# Patient Record
Sex: MALE | Race: WHITE | Employment: FULL TIME | URBAN - METROPOLITAN AREA
[De-identification: names, ages, dates, MRNs, and addresses within clinical notes are randomized per-mention and may not be internally consistent; named-entity substitution may affect disease eponyms.]

---

## 2022-04-01 RX ORDER — PANTOPRAZOLE SODIUM 40 MG/1
40 TABLET, DELAYED RELEASE ORAL DAILY
COMMUNITY

## 2022-04-01 RX ORDER — LANOLIN ALCOHOL/MO/W.PET/CERES
1000 CREAM (GRAM) TOPICAL DAILY
COMMUNITY

## 2022-04-01 RX ORDER — FEXOFENADINE HCL 180 MG/1
180 TABLET ORAL DAILY
COMMUNITY

## 2022-04-01 RX ORDER — HYDROCHLOROTHIAZIDE 25 MG/1
25 TABLET ORAL DAILY
COMMUNITY

## 2022-04-01 RX ORDER — CHOLECALCIFEROL (VITAMIN D3) 1250 MCG
CAPSULE ORAL
COMMUNITY
End: 2022-07-28 | Stop reason: ALTCHOICE

## 2022-04-01 RX ORDER — FOLIC ACID 1 MG/1
1 TABLET ORAL DAILY
COMMUNITY

## 2022-04-05 ENCOUNTER — ANESTHESIA EVENT (OUTPATIENT)
Dept: ENDOSCOPY | Age: 52
End: 2022-04-05
Payer: COMMERCIAL

## 2022-04-06 ENCOUNTER — ANESTHESIA (OUTPATIENT)
Dept: ENDOSCOPY | Age: 52
End: 2022-04-06
Payer: COMMERCIAL

## 2022-04-06 ENCOUNTER — HOSPITAL ENCOUNTER (INPATIENT)
Age: 52
LOS: 4 days | Discharge: HOME | DRG: 371 | End: 2022-04-10
Attending: EMERGENCY MEDICINE | Admitting: SURGERY
Payer: COMMERCIAL

## 2022-04-06 ENCOUNTER — APPOINTMENT (OUTPATIENT)
Dept: CT IMAGING | Age: 52
DRG: 371 | End: 2022-04-06
Payer: COMMERCIAL

## 2022-04-06 ENCOUNTER — HOSPITAL ENCOUNTER (OUTPATIENT)
Age: 52
Setting detail: OUTPATIENT SURGERY
Discharge: HOME | End: 2022-04-06
Attending: INTERNAL MEDICINE | Admitting: INTERNAL MEDICINE
Payer: COMMERCIAL

## 2022-04-06 VITALS
RESPIRATION RATE: 16 BRPM | HEART RATE: 76 BPM | TEMPERATURE: 98.3 F | HEIGHT: 71 IN | SYSTOLIC BLOOD PRESSURE: 120 MMHG | BODY MASS INDEX: 33.88 KG/M2 | OXYGEN SATURATION: 97 % | WEIGHT: 242 LBS | DIASTOLIC BLOOD PRESSURE: 77 MMHG

## 2022-04-06 VITALS
DIASTOLIC BLOOD PRESSURE: 70 MMHG | RESPIRATION RATE: 17 BRPM | OXYGEN SATURATION: 96 % | SYSTOLIC BLOOD PRESSURE: 105 MMHG

## 2022-04-06 DIAGNOSIS — R10.9 POSTOPERATIVE ABDOMINAL PAIN WITH FEVER: ICD-10-CM

## 2022-04-06 DIAGNOSIS — K63.1 PERFORATED BOWEL (HCC): Primary | ICD-10-CM

## 2022-04-06 DIAGNOSIS — G89.18 POSTOPERATIVE ABDOMINAL PAIN WITH FEVER: ICD-10-CM

## 2022-04-06 DIAGNOSIS — R50.82 POSTOPERATIVE ABDOMINAL PAIN WITH FEVER: ICD-10-CM

## 2022-04-06 DIAGNOSIS — Z85.038 PERSONAL HISTORY OF COLON CANCER: ICD-10-CM

## 2022-04-06 DIAGNOSIS — K21.9 GASTROESOPHAGEAL REFLUX DISEASE, UNSPECIFIED WHETHER ESOPHAGITIS PRESENT: ICD-10-CM

## 2022-04-06 PROBLEM — K66.8 PNEUMOPERITONEUM: Status: ACTIVE | Noted: 2022-04-06

## 2022-04-06 LAB
A/G RATIO: 1.7 (ref 1.1–2.2)
ALBUMIN SERPL-MCNC: 4.7 G/DL (ref 3.4–5)
ALP BLD-CCNC: 54 U/L (ref 40–129)
ALT SERPL-CCNC: 16 U/L (ref 10–40)
ANION GAP SERPL CALCULATED.3IONS-SCNC: 13 MMOL/L (ref 3–16)
AST SERPL-CCNC: 21 U/L (ref 15–37)
BASOPHILS ABSOLUTE: 0 K/UL (ref 0–0.2)
BASOPHILS RELATIVE PERCENT: 0.3 %
BILIRUB SERPL-MCNC: 0.5 MG/DL (ref 0–1)
BUN BLDV-MCNC: 10 MG/DL (ref 7–20)
CALCIUM SERPL-MCNC: 10.1 MG/DL (ref 8.3–10.6)
CHLORIDE BLD-SCNC: 100 MMOL/L (ref 99–110)
CO2: 25 MMOL/L (ref 21–32)
CREAT SERPL-MCNC: 1.1 MG/DL (ref 0.9–1.3)
EOSINOPHILS ABSOLUTE: 0.1 K/UL (ref 0–0.6)
EOSINOPHILS RELATIVE PERCENT: 0.9 %
GFR AFRICAN AMERICAN: >60
GFR NON-AFRICAN AMERICAN: >60
GLUCOSE BLD-MCNC: 110 MG/DL (ref 70–99)
HCT VFR BLD CALC: 46.2 % (ref 40.5–52.5)
HEMOGLOBIN: 15.6 G/DL (ref 13.5–17.5)
INFLUENZA A: NOT DETECTED
INFLUENZA B: NOT DETECTED
LACTIC ACID, SEPSIS: 1 MMOL/L (ref 0.4–1.9)
LYMPHOCYTES ABSOLUTE: 1 K/UL (ref 1–5.1)
LYMPHOCYTES RELATIVE PERCENT: 6.7 %
MCH RBC QN AUTO: 32 PG (ref 26–34)
MCHC RBC AUTO-ENTMCNC: 33.9 G/DL (ref 31–36)
MCV RBC AUTO: 94.4 FL (ref 80–100)
MONOCYTES ABSOLUTE: 1 K/UL (ref 0–1.3)
MONOCYTES RELATIVE PERCENT: 6.6 %
NEUTROPHILS ABSOLUTE: 12.6 K/UL (ref 1.7–7.7)
NEUTROPHILS RELATIVE PERCENT: 85.5 %
PDW BLD-RTO: 13.7 % (ref 12.4–15.4)
PLATELET # BLD: 305 K/UL (ref 135–450)
PMV BLD AUTO: 7.6 FL (ref 5–10.5)
POTASSIUM SERPL-SCNC: 3.9 MMOL/L (ref 3.5–5.1)
RBC # BLD: 4.89 M/UL (ref 4.2–5.9)
SARS-COV-2 RNA, RT PCR: NOT DETECTED
SODIUM BLD-SCNC: 138 MMOL/L (ref 136–145)
TOTAL PROTEIN: 7.5 G/DL (ref 6.4–8.2)
WBC # BLD: 14.8 K/UL (ref 4–11)

## 2022-04-06 PROCEDURE — 87636 SARSCOV2 & INF A&B AMP PRB: CPT

## 2022-04-06 PROCEDURE — 1200000000 HC SEMI PRIVATE

## 2022-04-06 PROCEDURE — 2580000003 HC RX 258: Performed by: NURSE ANESTHETIST, CERTIFIED REGISTERED

## 2022-04-06 PROCEDURE — 6360000004 HC RX CONTRAST MEDICATION: Performed by: PHYSICIAN ASSISTANT

## 2022-04-06 PROCEDURE — 87077 CULTURE AEROBIC IDENTIFY: CPT

## 2022-04-06 PROCEDURE — 87186 SC STD MICRODIL/AGAR DIL: CPT

## 2022-04-06 PROCEDURE — 7100000011 HC PHASE II RECOVERY - ADDTL 15 MIN: Performed by: INTERNAL MEDICINE

## 2022-04-06 PROCEDURE — 80053 COMPREHEN METABOLIC PANEL: CPT

## 2022-04-06 PROCEDURE — 6360000002 HC RX W HCPCS: Performed by: PHYSICIAN ASSISTANT

## 2022-04-06 PROCEDURE — 3700000000 HC ANESTHESIA ATTENDED CARE: Performed by: INTERNAL MEDICINE

## 2022-04-06 PROCEDURE — 88305 TISSUE EXAM BY PATHOLOGIST: CPT

## 2022-04-06 PROCEDURE — 88342 IMHCHEM/IMCYTCHM 1ST ANTB: CPT

## 2022-04-06 PROCEDURE — 6360000002 HC RX W HCPCS: Performed by: NURSE ANESTHETIST, CERTIFIED REGISTERED

## 2022-04-06 PROCEDURE — 2580000003 HC RX 258: Performed by: PHYSICIAN ASSISTANT

## 2022-04-06 PROCEDURE — 87150 DNA/RNA AMPLIFIED PROBE: CPT

## 2022-04-06 PROCEDURE — 87040 BLOOD CULTURE FOR BACTERIA: CPT

## 2022-04-06 PROCEDURE — 2500000003 HC RX 250 WO HCPCS: Performed by: NURSE ANESTHETIST, CERTIFIED REGISTERED

## 2022-04-06 PROCEDURE — 74177 CT ABD & PELVIS W/CONTRAST: CPT

## 2022-04-06 PROCEDURE — 2580000003 HC RX 258: Performed by: STUDENT IN AN ORGANIZED HEALTH CARE EDUCATION/TRAINING PROGRAM

## 2022-04-06 PROCEDURE — 83605 ASSAY OF LACTIC ACID: CPT

## 2022-04-06 PROCEDURE — 7100000010 HC PHASE II RECOVERY - FIRST 15 MIN: Performed by: INTERNAL MEDICINE

## 2022-04-06 PROCEDURE — 3700000001 HC ADD 15 MINUTES (ANESTHESIA): Performed by: INTERNAL MEDICINE

## 2022-04-06 PROCEDURE — 3609010300 HC COLONOSCOPY W/BIOPSY SINGLE/MULTIPLE: Performed by: INTERNAL MEDICINE

## 2022-04-06 PROCEDURE — 2709999900 HC NON-CHARGEABLE SUPPLY: Performed by: INTERNAL MEDICINE

## 2022-04-06 PROCEDURE — 99283 EMERGENCY DEPT VISIT LOW MDM: CPT

## 2022-04-06 PROCEDURE — 96374 THER/PROPH/DIAG INJ IV PUSH: CPT

## 2022-04-06 PROCEDURE — 3609012400 HC EGD TRANSORAL BIOPSY SINGLE/MULTIPLE: Performed by: INTERNAL MEDICINE

## 2022-04-06 PROCEDURE — 85025 COMPLETE CBC W/AUTO DIFF WBC: CPT

## 2022-04-06 RX ORDER — CIPROFLOXACIN 2 MG/ML
400 INJECTION, SOLUTION INTRAVENOUS CONTINUOUS
Status: DISCONTINUED | OUTPATIENT
Start: 2022-04-06 | End: 2022-04-07 | Stop reason: HOSPADM

## 2022-04-06 RX ORDER — SODIUM CHLORIDE 0.9 % (FLUSH) 0.9 %
5-40 SYRINGE (ML) INJECTION PRN
Status: DISCONTINUED | OUTPATIENT
Start: 2022-04-06 | End: 2022-04-06 | Stop reason: HOSPADM

## 2022-04-06 RX ORDER — SODIUM CHLORIDE 9 MG/ML
INJECTION, SOLUTION INTRAVENOUS CONTINUOUS
Status: DISCONTINUED | OUTPATIENT
Start: 2022-04-06 | End: 2022-04-06 | Stop reason: HOSPADM

## 2022-04-06 RX ORDER — SODIUM CHLORIDE 9 MG/ML
25 INJECTION, SOLUTION INTRAVENOUS PRN
Status: DISCONTINUED | OUTPATIENT
Start: 2022-04-06 | End: 2022-04-06 | Stop reason: HOSPADM

## 2022-04-06 RX ORDER — SODIUM CHLORIDE 9 MG/ML
INJECTION, SOLUTION INTRAVENOUS CONTINUOUS PRN
Status: DISCONTINUED | OUTPATIENT
Start: 2022-04-06 | End: 2022-04-06 | Stop reason: SDUPTHER

## 2022-04-06 RX ORDER — LIDOCAINE HYDROCHLORIDE 20 MG/ML
INJECTION, SOLUTION EPIDURAL; INFILTRATION; INTRACAUDAL; PERINEURAL PRN
Status: DISCONTINUED | OUTPATIENT
Start: 2022-04-06 | End: 2022-04-06 | Stop reason: SDUPTHER

## 2022-04-06 RX ORDER — SODIUM CHLORIDE 0.9 % (FLUSH) 0.9 %
5-40 SYRINGE (ML) INJECTION EVERY 12 HOURS SCHEDULED
Status: DISCONTINUED | OUTPATIENT
Start: 2022-04-06 | End: 2022-04-06 | Stop reason: HOSPADM

## 2022-04-06 RX ORDER — 0.9 % SODIUM CHLORIDE 0.9 %
30 INTRAVENOUS SOLUTION INTRAVENOUS ONCE
Status: COMPLETED | OUTPATIENT
Start: 2022-04-06 | End: 2022-04-07

## 2022-04-06 RX ORDER — PROPOFOL 10 MG/ML
INJECTION, EMULSION INTRAVENOUS PRN
Status: DISCONTINUED | OUTPATIENT
Start: 2022-04-06 | End: 2022-04-06 | Stop reason: SDUPTHER

## 2022-04-06 RX ADMIN — PROPOFOL 100 MG: 10 INJECTION, EMULSION INTRAVENOUS at 13:23

## 2022-04-06 RX ADMIN — IOPAMIDOL 75 ML: 755 INJECTION, SOLUTION INTRAVENOUS at 21:27

## 2022-04-06 RX ADMIN — PROPOFOL 100 MG: 10 INJECTION, EMULSION INTRAVENOUS at 13:42

## 2022-04-06 RX ADMIN — SODIUM CHLORIDE: 9 INJECTION, SOLUTION INTRAVENOUS at 13:11

## 2022-04-06 RX ADMIN — SODIUM CHLORIDE: 9 INJECTION, SOLUTION INTRAVENOUS at 13:47

## 2022-04-06 RX ADMIN — CIPROFLOXACIN 400 MG: 2 INJECTION, SOLUTION INTRAVENOUS at 22:51

## 2022-04-06 RX ADMIN — PROPOFOL 100 MG: 10 INJECTION, EMULSION INTRAVENOUS at 13:16

## 2022-04-06 RX ADMIN — SODIUM CHLORIDE: 9 INJECTION, SOLUTION INTRAVENOUS at 12:46

## 2022-04-06 RX ADMIN — PROPOFOL 100 MG: 10 INJECTION, EMULSION INTRAVENOUS at 13:27

## 2022-04-06 RX ADMIN — PROPOFOL 100 MG: 10 INJECTION, EMULSION INTRAVENOUS at 13:36

## 2022-04-06 RX ADMIN — PROPOFOL 100 MG: 10 INJECTION, EMULSION INTRAVENOUS at 13:20

## 2022-04-06 RX ADMIN — PROPOFOL 100 MG: 10 INJECTION, EMULSION INTRAVENOUS at 13:31

## 2022-04-06 RX ADMIN — PROPOFOL 100 MG: 10 INJECTION, EMULSION INTRAVENOUS at 13:18

## 2022-04-06 RX ADMIN — PROPOFOL 50 MG: 10 INJECTION, EMULSION INTRAVENOUS at 13:48

## 2022-04-06 RX ADMIN — LIDOCAINE HYDROCHLORIDE 60 MG: 20 INJECTION, SOLUTION EPIDURAL; INFILTRATION; INTRACAUDAL; PERINEURAL at 13:16

## 2022-04-06 RX ADMIN — SODIUM CHLORIDE 2259 ML: 9 INJECTION, SOLUTION INTRAVENOUS at 22:50

## 2022-04-06 ASSESSMENT — PAIN SCALES - GENERAL
PAINLEVEL_OUTOF10: 0

## 2022-04-06 ASSESSMENT — ENCOUNTER SYMPTOMS
DIARRHEA: 0
NAUSEA: 0
SHORTNESS OF BREATH: 0
RHINORRHEA: 0
ABDOMINAL PAIN: 0
WHEEZING: 0
SHORTNESS OF BREATH: 0
VOMITING: 0
COUGH: 0

## 2022-04-06 ASSESSMENT — PAIN - FUNCTIONAL ASSESSMENT: PAIN_FUNCTIONAL_ASSESSMENT: 0-10

## 2022-04-06 NOTE — H&P
[id number]   Pre-operative History and Physical    Patient: Shelli Acosta  : 1970  Acct#:     HISTORY OF PRESENT ILLNESS:    The patient is a 46 y.o. male who presents for EGD/colonoscopy    Indications: stage IV colon cancer at the splenic flexure with gastric involvement s/p failed stenting, s/p loop colostomy, to reassess disease prior to potential parastomal hernia repair    Past Medical History:        Diagnosis Date    Arthritis     rheumatoid    Cancer ([facility] [address]     colon    Hypertension       Past Surgical History:        Procedure Laterality Date    ABDOMEN SURGERY      colectomy with ostomy    COLONOSCOPY      PORT SURGERY        Medications Prior to Admission:   No current facility-administered medications on file prior to encounter.      Current Outpatient Medications on File Prior to Encounter   Medication Sig Dispense Refill    inFLIXimab-dyyb (INFLECTRA IV) Infuse intravenously Once every 6 weeks      pantoprazole (PROTONIX) 40 MG tablet Take 40 mg by mouth daily      methotrexate (RHEUMATREX) 2.5 MG chemo tablet Take 2.5 mg by mouth once a week Takes 4 weekly      folic acid (FOLVITE) 1 MG tablet Take 1 mg by mouth daily      hydroCHLOROthiazide (HYDRODIURIL) 25 MG tablet Take 25 mg by mouth daily      vitamin B-12 (CYANOCOBALAMIN) 1000 MCG tablet Take 1,000 mcg by mouth daily      fexofenadine (ALLEGRA ALLERGY) 180 MG tablet Take 180 mg by mouth daily      Cholecalciferol (VITAMIN D3) 1.25 MG (32061 UT) CAPS Take by mouth      losartan (COZAAR) 100 MG tablet Take 100 mg by mouth daily          Allergies:  Amoxicillin    Social History:   Social History     Socioeconomic History    Marital status:      Spouse name: Not on file    Number of children: Not on file    Years of education: Not on file    Highest education level: Not on file   Occupational History    Not on file   Tobacco Use    Smoking status: Never Smoker    Smokeless tobacco: Never Used   Substance and Sexual Activity    Alcohol use: Yes     Comment: rarely    Drug use: Not on file    Sexual activity: Not on file   Other Topics Concern    Not on file   Social History Narrative    Not on file     Social Determinants of Health     Financial Resource Strain:     Difficulty of Paying Living Expenses: Not on file   Food Insecurity:     Worried About Running Out of Food in the Last Year: Not on file    Daniela of Food in the Last Year: Not on file   Transportation Needs:     Lack of Transportation (Medical): Not on file    Lack of Transportation (Non-Medical): Not on file   Physical Activity:     Days of Exercise per Week: Not on file    Minutes of Exercise per Session: Not on file   Stress:     Feeling of Stress : Not on file   Social Connections:     Frequency of Communication with Friends and Family: Not on file    Frequency of Social Gatherings with Friends and Family: Not on file    Attends Scientologist Services: Not on file    Active Member of [address] [place] [address] or Organizations: Not on file    Attends Club or Organization Meetings: Not on file    Marital Status: Not on file   Intimate Partner Violence:     Fear of Current or Ex-Partner: Not on file    Emotionally Abused: Not on file    Physically Abused: Not on file    Sexually Abused: Not on file   Housing Stability:     Unable to Pay for Housing in the Last Year: Not on file    Number of [place] in the Last Year: Not on file    Unstable Housing in the Last Year: Not on file      Family History:   History reviewed. No pertinent family history.      PHYSICAL EXAM:      Ht 5' 11\" (1.803 m)   Wt 242 lb (109.8 kg)   BMI 33.75 kg/m²  I        Heart:  Normal apical impulse, regular rate and rhythm, normal S1 and S2, no S3 or S4, and no murmur noted    Lungs:  No increased work of breathing, good air exchange, clear to auscultation bilaterally, no crackles or wheezing    Abdomen:  No scars, normal bowel sounds, soft, non-distended, non-tender, no masses palpated, no hepatosplenomegally      ASA Class  ASA 2 - Patient with mild systemic disease with no functional limitations    Mallampati Class: II      ASSESSMENT AND PLAN:    1. Patient is a suitable candidate for endoscopic procedure and attendant anesthesia  2. Risks, benefits, alternatives of procedure discussed in detail with patient including risks of bleeding, infection, perforation, risks of sedation, risks of missed lesions. The patient wishes to proceed.

## 2022-04-06 NOTE — ANESTHESIA POSTPROCEDURE EVALUATION
Department of Anesthesiology  Postprocedure Note    Patient: Shalom Parks  MRN: [id number]  YOB: 1970  Date of evaluation: 4/6/2022  Time:  2:01 PM     Procedure Summary     Date: 04/06/22 Room / Location: [address] [place] [address]    Anesthesia Start: 1311 Anesthesia Stop: 5093    Procedures:       COLONOSCOPY WITH BIOPSY (N/A )      EGD BIOPSY (N/A ) Diagnosis:       Personal history of colon cancer      Gastroesophageal reflux disease, unspecified whether esophagitis present      (Personal history of colon cancer, Gastroesophageal reflux disease)    Surgeons: Laina Cuenca MD Responsible Provider: Liliya Yip MD    Anesthesia Type: MAC ASA Status: 2          Anesthesia Type: MAC    Davis Phase I: Davis Score: 10    Davis Phase II:      Last vitals: Reviewed and per EMR flowsheets.        Anesthesia Post Evaluation    Patient location during evaluation: PACU  Patient participation: complete - patient participated  Level of consciousness: awake  Airway patency: patent  Nausea & Vomiting: no nausea and no vomiting  Cardiovascular status: blood pressure returned to baseline  Respiratory status: acceptable  Hydration status: stable  Multimodal analgesia pain management approach

## 2022-04-06 NOTE — ANESTHESIA PRE PROCEDURE
Department of Anesthesiology  Preprocedure Note       Name:  Nino Mckoy   Age:  46 y.o.  :  1970                                          MRN:  [id number]         Date:  2022      Surgeon: Rosie Zheng):  Meri Felton MD    Procedure: Procedure(s):  COLONOSCOPY DIAGNOSTIC  EGD ESOPHAGOGASTRODUODENOSCOPY    Medications prior to admission:   Prior to Admission medications    Medication Sig Start Date End Date Taking?  Authorizing Provider   inFLIXimab-dyyb (INFLECTRA IV) Infuse intravenously Once every 6 weeks   Yes Historical Provider, MD   pantoprazole (PROTONIX) 40 MG tablet Take 40 mg by mouth daily   Yes Historical Provider, MD   methotrexate (RHEUMATREX) 2.5 MG chemo tablet Take 2.5 mg by mouth once a week Takes 4 weekly   Yes Historical Provider, MD   folic acid (FOLVITE) 1 MG tablet Take 1 mg by mouth daily   Yes Historical Provider, MD   hydroCHLOROthiazide (HYDRODIURIL) 25 MG tablet Take 25 mg by mouth daily   Yes Historical Provider, MD   vitamin B-12 (CYANOCOBALAMIN) 1000 MCG tablet Take 1,000 mcg by mouth daily   Yes Historical Provider, MD   fexofenadine (ALLEGRA ALLERGY) 180 MG tablet Take 180 mg by mouth daily   Yes Historical Provider, MD   Cholecalciferol (VITAMIN D3) 1.25 MG (99190 UT) CAPS Take by mouth   Yes Historical Provider, MD   losartan (COZAAR) 100 MG tablet Take 100 mg by mouth daily    Historical Provider, MD       Current medications:    Current Facility-Administered Medications   Medication Dose Route Frequency Provider Last Rate Last Admin    0.9 % sodium chloride infusion   IntraVENous Continuous Chyna Peoples  mL/hr at 22 1246 New Bag at 22 1246    sodium chloride flush 0.9 % injection 5-40 mL  5-40 mL IntraVENous 2 times per day Chyna Peoples MD        sodium chloride flush 0.9 % injection 5-40 mL  5-40 mL IntraVENous PRN Chyna Peoples MD        0.9 % sodium chloride infusion  25 mL IntraVENous PRN Chyna Peoples MD           Allergies: Allergies   Allergen Reactions    Amoxicillin Hives       Problem List:  There is no problem list on file for this patient. Past Medical History:        Diagnosis Date    Arthritis     rheumatoid    Cancer ([address]     colon    Hypertension        Past Surgical History:        Procedure Laterality Date    ABDOMEN SURGERY      colectomy with ostomy    COLONOSCOPY      PORT SURGERY         Social History:    Social History     Tobacco Use    Smoking status: Never Smoker    Smokeless tobacco: Never Used   Substance Use Topics    Alcohol use: Yes     Comment: rarely                                Counseling given: Not Answered      Vital Signs (Current):   Vitals:    04/01/22 1143 04/06/22 1233   BP:  126/78   Pulse:  90   Resp:  17   Temp:  97.9 °F (36.6 °C)   TempSrc:  Temporal   SpO2:  98%   Weight: 242 lb (109.8 kg) 242 lb (109.8 kg)   Height: 5' 11\" (1.803 m) 5' 11\" (1.803 m)                                              BP Readings from Last 3 Encounters:   04/06/22 126/78   09/21/16 121/61       NPO Status: Time of last liquid consumption: 0815                        Time of last solid consumption: 1830                        Date of last liquid consumption: 04/06/22                        Date of last solid food consumption: 04/04/22    BMI:   Wt Readings from Last 3 Encounters:   04/06/22 242 lb (109.8 kg)   09/21/16 205 lb (93 kg)     Body mass index is 33.75 kg/m².     CBC:   Lab Results   Component Value Date    WBC 10.0 09/21/2016    RBC 3.98 09/21/2016    HGB 9.8 09/21/2016    HCT 30.8 09/21/2016    MCV 77.6 09/21/2016    RDW 15.6 09/21/2016     09/21/2016       CMP:   Lab Results   Component Value Date     09/21/2016    K 4.0 09/21/2016    CL 98 09/21/2016    CO2 29 09/21/2016    BUN 9 09/21/2016    CREATININE 0.9 09/21/2016    GFRAA >60 09/21/2016    AGRATIO 1.2 09/21/2016    LABGLOM >60 09/21/2016    GLUCOSE 94 09/21/2016    PROT 6.8 09/21/2016    CALCIUM 9.4 09/21/2016 BILITOT <0.2 09/21/2016    ALKPHOS 45 09/21/2016    AST 13 09/21/2016    ALT 10 09/21/2016       POC Tests: No results for input(s): POCGLU, POCNA, POCK, POCCL, POCBUN, POCHEMO, POCHCT in the last 72 hours. Coags: No results found for: PROTIME, INR, APTT    HCG (If Applicable): No results found for: PREGTESTUR, PREGSERUM, HCG, HCGQUANT     ABGs: No results found for: PHART, PO2ART, PFW3BMW, IOI5CQH, BEART, Y9WAJGTA     Type & Screen (If Applicable):  No results found for: LABABO, LABRH    Drug/Infectious Status (If Applicable):  No results found for: HIV, HEPCAB    COVID-19 Screening (If Applicable): No results found for: COVID19        Anesthesia Evaluation  Patient summary reviewed and Nursing notes reviewed no history of anesthetic complications:   Airway: Mallampati: I  TM distance: >3 FB   Neck ROM: full  Mouth opening: > = 3 FB Dental: normal exam         Pulmonary: breath sounds clear to auscultation      (-) asthma and shortness of breath                           Cardiovascular:  Exercise tolerance: good (>4 METS),   (+) hypertension (on HCTZ-Losartan):,     (-) past MI, CAD, dysrhythmias and  MAJANO      Rhythm: regular  Rate: normal                    Neuro/Psych:      (-) TIA and CVA           GI/Hepatic/Renal:   (+) bowel prep,      (-) GERD, liver disease and no renal disease       Endo/Other:    (+) : arthritis (on methotrexate and infliximab): rheumatoid. , .    (-) diabetes mellitus               Abdominal:   (+) obese,           Vascular: negative vascular ROS. Other Findings:           Anesthesia Plan      MAC     ASA 2     (Discussed risks and benefits to sedation including nausea, vomiting, allergic reaction, headache, delayed cognitive recovery, stroke, heart attack, respiratory depression, and death which patient understood and agreed to proceed.    The patient was given the opportunity to ask questions and all questions were answered to the patient's satisfaction.  )  Induction: intravenous. Anesthetic plan and risks discussed with patient. Plan discussed with CRNA. This pre-anesthesia assessment may be used as a history and physical.    DOS STAFF ADDENDUM:    Pt seen and examined, chart reviewed (including anesthesia, drug and allergy history). No interval changes to history and physical examination. Anesthetic plan, risks, benefits, alternatives, and personnel involved discussed with patient. Patient verbalized an understanding and agrees to proceed.       Josiane Zapien MD  April 6, 2022  12:53 PM

## 2022-04-06 NOTE — OP NOTE
Esophagogastroduodenoscopy/Colonoscopy Procedure Note      Patient: Niki Patel  : 1970  Acct#:     Procedure: Esophagogastroduodenoscopy and colonoscopy    Date:  2022    Surgeon:  Lucina Traore MD    Preoperative Diagnosis:     stage IV colon cancer at the splenic flexure with gastric involvement s/p failed stenting, s/p loop colostomy, to reassess disease prior to potential parastomal hernia repair    Postoperative Diagnosis:    EGD:  1) mid portion of a colonic stent courses through the gastric wall (enters then exits the stomach), with fracture of the mid intragastric portion of the stent  2) 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 within the stent lumen, biopsied    Colonoscopy:  1) from rectum to 50 cm from anal verge: sigmoid diverticulosis, diverticular mucus obscuring much of lumen, no obstructing masses, scope could not be advanced beyond descending colon (tumor not seen) due to looping  2) proximally from colostomy: no obstructing mass from the colostomy to the cecum within limits of a fair prep  3) distally from colostomy: stenosis with tumor ingrowth as well as proximal struts of colonic stent seen at 25 cm from colostomy, biopsies of tumor taken    Consent:  The patient or their legal guardian has signed a consent, and is aware of the potential risks, benefits, alternatives, and potential complications of this procedure. These include, but are not limited to hemorrhage, bleeding, post procedural pain, perforation, phlebitis, aspiration, hypotension, hypoxia, cardiovascular events such as arryhthmia, and possibly death. Additionally, the possibility of missed lesions was conveyed to patient. Anesthesia:  MAC    Procedure: An informed consent was obtained from the patient after explanation of indications, benefits, possible risks and complications of the procedure.   The patient was then taken to the endoscopy suite, placed in the left lateral position, and the above IV anesthesia was administered. The Olympus gastroscope was placed in the patient's mouth and advanced under direct vision through the esophagus and stomach to the second portion of the duodenum. By EGD the esophagus was normal.  The proximal part of the stomach was normal.  In the posterior gastric body we could visualize the previous colonic stent which appeared to go in and then out of the stomach. It was fractured in the midportion. We could look inside the stent and we actually could see a fistulous tract presumably to the colon with some surrounding malignant appearing tissue that was biopsied. The distal stomach was normal.  The duodenum was normal to the second portion. The ampulla of Vater was not seen. Gastroscope was withdrawn as the upper GI tract was decompressed. Following esophagogastroduodenoscopy the bed was turned 180 degrees and then colonoscopy was performed. First we passed a pediatric colonoscope through the rectum. Digital rectal exam was normal.  The scope was advanced from the rectum to 50 cm from the anal verge. There were diverticulosis in the sigmoid colon. There were retained mucus balls due to diversion changes which obscured much of the mucosa however there were no obstructing masses to 50 cm. At that point due to scope looping we cannot advance the scope further. We did not visualize the tumor. The scope was withdrawn and retroflexion not performed in the rectum. We then removed the pediatric colonoscope and then passed the scope from the prolapsed colostomy all the way to the cecum. There are no obstructing masses although prep was only fair with some retained food particles and we could not insufflate well due to lack of air retention. We then withdrew the scope and passed the scope from the colostomy distally and we could visualized a stenotic tumor 25 cm from the colostomy.   We could actually see the proximal struts from the previous colonic stent at the top edge of this tumor. We took multiple biopsies of the stenotic tumor. We could not advance the scope any further. We withdrew the scope. There were no other obstructing masses seen. The patient was decompressed as the colonoscope was withdrawn. All instruments were removed. The patient tolerated the procedure well and was taken to the PACU in good condition. 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 Further management per Surgery--findings conveyed to Dr Opal Menezes MD  [facility]  4/6/2022  [phone]

## 2022-04-07 LAB
ANION GAP SERPL CALCULATED.3IONS-SCNC: 12 MMOL/L (ref 3–16)
BUN BLDV-MCNC: 8 MG/DL (ref 7–20)
CALCIUM SERPL-MCNC: 9.6 MG/DL (ref 8.3–10.6)
CHLORIDE BLD-SCNC: 101 MMOL/L (ref 99–110)
CO2: 26 MMOL/L (ref 21–32)
CREAT SERPL-MCNC: 0.9 MG/DL (ref 0.9–1.3)
GFR AFRICAN AMERICAN: >60
GFR NON-AFRICAN AMERICAN: >60
GLUCOSE BLD-MCNC: 105 MG/DL (ref 70–99)
HCT VFR BLD CALC: 40.8 % (ref 40.5–52.5)
HEMOGLOBIN: 14.4 G/DL (ref 13.5–17.5)
MCH RBC QN AUTO: 33.2 PG (ref 26–34)
MCHC RBC AUTO-ENTMCNC: 35.2 G/DL (ref 31–36)
MCV RBC AUTO: 94.4 FL (ref 80–100)
PDW BLD-RTO: 13.8 % (ref 12.4–15.4)
PLATELET # BLD: 235 K/UL (ref 135–450)
PMV BLD AUTO: 7.9 FL (ref 5–10.5)
POTASSIUM SERPL-SCNC: 4 MMOL/L (ref 3.5–5.1)
RBC # BLD: 4.32 M/UL (ref 4.2–5.9)
REPORT: NORMAL
SODIUM BLD-SCNC: 139 MMOL/L (ref 136–145)
WBC # BLD: 9.5 K/UL (ref 4–11)

## 2022-04-07 PROCEDURE — 85027 COMPLETE CBC AUTOMATED: CPT

## 2022-04-07 PROCEDURE — 99223 1ST HOSP IP/OBS HIGH 75: CPT | Performed by: INTERNAL MEDICINE

## 2022-04-07 PROCEDURE — 6360000002 HC RX W HCPCS: Performed by: FAMILY MEDICINE

## 2022-04-07 PROCEDURE — 1200000000 HC SEMI PRIVATE

## 2022-04-07 PROCEDURE — 80048 BASIC METABOLIC PNL TOTAL CA: CPT

## 2022-04-07 PROCEDURE — 6360000002 HC RX W HCPCS: Performed by: PHYSICIAN ASSISTANT

## 2022-04-07 PROCEDURE — 36415 COLL VENOUS BLD VENIPUNCTURE: CPT

## 2022-04-07 PROCEDURE — 2580000003 HC RX 258: Performed by: FAMILY MEDICINE

## 2022-04-07 PROCEDURE — APPNB30 APP NON BILLABLE TIME 0-30 MINS: Performed by: NURSE PRACTITIONER

## 2022-04-07 PROCEDURE — 2500000003 HC RX 250 WO HCPCS: Performed by: PHYSICIAN ASSISTANT

## 2022-04-07 PROCEDURE — 99223 1ST HOSP IP/OBS HIGH 75: CPT | Performed by: SURGERY

## 2022-04-07 PROCEDURE — 6360000002 HC RX W HCPCS: Performed by: INTERNAL MEDICINE

## 2022-04-07 PROCEDURE — 6370000000 HC RX 637 (ALT 250 FOR IP): Performed by: FAMILY MEDICINE

## 2022-04-07 RX ORDER — ONDANSETRON 2 MG/ML
4 INJECTION INTRAMUSCULAR; INTRAVENOUS EVERY 6 HOURS PRN
Status: DISCONTINUED | OUTPATIENT
Start: 2022-04-07 | End: 2022-04-10 | Stop reason: HOSPADM

## 2022-04-07 RX ORDER — MORPHINE SULFATE 4 MG/ML
4 INJECTION, SOLUTION INTRAMUSCULAR; INTRAVENOUS EVERY 4 HOURS PRN
Status: DISCONTINUED | OUTPATIENT
Start: 2022-04-07 | End: 2022-04-10 | Stop reason: HOSPADM

## 2022-04-07 RX ORDER — SODIUM CHLORIDE 9 MG/ML
INJECTION, SOLUTION INTRAVENOUS CONTINUOUS
Status: DISCONTINUED | OUTPATIENT
Start: 2022-04-07 | End: 2022-04-10 | Stop reason: HOSPADM

## 2022-04-07 RX ORDER — ONDANSETRON 4 MG/1
4 TABLET, ORALLY DISINTEGRATING ORAL EVERY 8 HOURS PRN
Status: DISCONTINUED | OUTPATIENT
Start: 2022-04-07 | End: 2022-04-10 | Stop reason: HOSPADM

## 2022-04-07 RX ORDER — ACETAMINOPHEN 325 MG/1
650 TABLET ORAL EVERY 6 HOURS PRN
Status: DISCONTINUED | OUTPATIENT
Start: 2022-04-07 | End: 2022-04-10 | Stop reason: HOSPADM

## 2022-04-07 RX ORDER — LOSARTAN POTASSIUM 25 MG/1
50 TABLET ORAL DAILY
Status: DISCONTINUED | OUTPATIENT
Start: 2022-04-07 | End: 2022-04-10 | Stop reason: HOSPADM

## 2022-04-07 RX ORDER — HYDROCHLOROTHIAZIDE 25 MG/1
25 TABLET ORAL DAILY
Status: DISCONTINUED | OUTPATIENT
Start: 2022-04-07 | End: 2022-04-07

## 2022-04-07 RX ORDER — FLUCONAZOLE 2 MG/ML
200 INJECTION, SOLUTION INTRAVENOUS EVERY 24 HOURS
Status: DISCONTINUED | OUTPATIENT
Start: 2022-04-07 | End: 2022-04-10 | Stop reason: HOSPADM

## 2022-04-07 RX ORDER — SODIUM CHLORIDE 0.9 % (FLUSH) 0.9 %
5-40 SYRINGE (ML) INJECTION EVERY 12 HOURS SCHEDULED
Status: DISCONTINUED | OUTPATIENT
Start: 2022-04-07 | End: 2022-04-10 | Stop reason: HOSPADM

## 2022-04-07 RX ORDER — SODIUM CHLORIDE 0.9 % (FLUSH) 0.9 %
5-40 SYRINGE (ML) INJECTION PRN
Status: DISCONTINUED | OUTPATIENT
Start: 2022-04-07 | End: 2022-04-10 | Stop reason: HOSPADM

## 2022-04-07 RX ORDER — SODIUM CHLORIDE 9 MG/ML
INJECTION, SOLUTION INTRAVENOUS PRN
Status: DISCONTINUED | OUTPATIENT
Start: 2022-04-07 | End: 2022-04-10 | Stop reason: HOSPADM

## 2022-04-07 RX ORDER — ACETAMINOPHEN 650 MG/1
650 SUPPOSITORY RECTAL EVERY 6 HOURS PRN
Status: DISCONTINUED | OUTPATIENT
Start: 2022-04-07 | End: 2022-04-10 | Stop reason: HOSPADM

## 2022-04-07 RX ORDER — POLYETHYLENE GLYCOL 3350 17 G/17G
17 POWDER, FOR SOLUTION ORAL DAILY PRN
Status: DISCONTINUED | OUTPATIENT
Start: 2022-04-07 | End: 2022-04-10 | Stop reason: HOSPADM

## 2022-04-07 RX ADMIN — HYDROCHLOROTHIAZIDE 25 MG: 25 TABLET ORAL at 08:48

## 2022-04-07 RX ADMIN — CIPROFLOXACIN 400 MG: 2 INJECTION, SOLUTION INTRAVENOUS at 00:14

## 2022-04-07 RX ADMIN — SODIUM CHLORIDE, PRESERVATIVE FREE 10 ML: 5 INJECTION INTRAVENOUS at 08:49

## 2022-04-07 RX ADMIN — ENOXAPARIN SODIUM 40 MG: 40 INJECTION SUBCUTANEOUS at 08:48

## 2022-04-07 RX ADMIN — PIPERACILLIN AND TAZOBACTAM 3375 MG: 3; .375 INJECTION, POWDER, LYOPHILIZED, FOR SOLUTION INTRAVENOUS at 02:12

## 2022-04-07 RX ADMIN — METRONIDAZOLE 500 MG: 500 INJECTION, SOLUTION INTRAVENOUS at 00:18

## 2022-04-07 RX ADMIN — SODIUM CHLORIDE: 9 INJECTION, SOLUTION INTRAVENOUS at 00:41

## 2022-04-07 RX ADMIN — PIPERACILLIN AND TAZOBACTAM 3375 MG: 3; .375 INJECTION, POWDER, LYOPHILIZED, FOR SOLUTION INTRAVENOUS at 17:15

## 2022-04-07 RX ADMIN — FLUCONAZOLE 200 MG: 2 INJECTION, SOLUTION INTRAVENOUS at 14:37

## 2022-04-07 RX ADMIN — PIPERACILLIN AND TAZOBACTAM 3375 MG: 3; .375 INJECTION, POWDER, LYOPHILIZED, FOR SOLUTION INTRAVENOUS at 10:10

## 2022-04-07 RX ADMIN — LOSARTAN POTASSIUM 50 MG: 25 TABLET, FILM COATED ORAL at 08:48

## 2022-04-07 ASSESSMENT — PAIN SCALES - GENERAL
PAINLEVEL_OUTOF10: 0

## 2022-04-07 ASSESSMENT — ENCOUNTER SYMPTOMS
DIARRHEA: 0
CONSTIPATION: 0
TROUBLE SWALLOWING: 0
WHEEZING: 0
SORE THROAT: 0
EYE DISCHARGE: 0
COUGH: 0
RHINORRHEA: 0
SHORTNESS OF BREATH: 0
BACK PAIN: 0
ABDOMINAL PAIN: 1
EYE REDNESS: 0
NAUSEA: 0

## 2022-04-07 ASSESSMENT — PAIN DESCRIPTION - PAIN TYPE: TYPE: ACUTE PAIN

## 2022-04-07 NOTE — CONSULTS
[facility] GENERAL AND LAPAROSCOPIC SURGERY                       PATIENT NAME: Portillo Ortiz     ADMISSION DATE: 4/6/2022  7:55 PM      TODAY'S DATE: 4/7/2022    Reason for Consult:  Pneumoperitoneum    Requesting Physician:  NARINDER Bañuelos    HISTORY OF PRESENT ILLNESS:              The patient is a 46 y.o. male who presents with abdominal concerns post colonoscopy. Had procedure done, had low grade fever and chills, presented to ER. Pt had scope with Dr. Dmitry Fernandez yesterday, has longstanding known metastatic colon cancer. Initially got stent for obstruction at splenic flexure, unsuccessful , and then loop TV colostomy. Years ago. Has had adj chemo and immunotherapy,  Had disease burden drastically reduced, but not totally curative. No resection of primary has been done. Presents now post colonoscopy. Had stent erosion thru colon, and fistula into stomach, with stent visible on upper and lower scopes. (photos and report reviewed). Pt feeling better, admitted with NPO, and IV atbx overnight, no recurring fever. Has had plans to meet with Dr. General Montalvo at [facility] regarding colon status / hernia / cancer etc tomorrow. No N/V/D.  No fever this am.     Past Medical History:        Diagnosis Date    Arthritis     rheumatoid    Cancer ([address]     colon    Hypertension        Past Surgical History:        Procedure Laterality Date    ABDOMEN SURGERY      colectomy with ostomy    COLONOSCOPY      COLONOSCOPY N/A 4/6/2022    COLONOSCOPY WITH BIOPSY performed by Oxana Joya MD at [address] [place] [address] GASTROINTESTINAL ENDOSCOPY N/A 4/6/2022    EGD BIOPSY performed by Oxana Joya MD at [place] [address]       Current Medications:   Current Facility-Administered Medications: piperacillin-tazobactam (ZOSYN) 3,375 mg in dextrose 5 % 50 mL IVPB extended infusion (mini-bag), 3,375 mg, IntraVENous, Q8H  0.9 % sodium chloride infusion, , IntraVENous, Continuous  morphine injection 4 mg, 4 mg, IntraVENous, Q4H PRN  losartan (COZAAR) tablet 50 mg, 50 mg, Oral, Daily  hydroCHLOROthiazide (HYDRODIURIL) tablet 25 mg, 25 mg, Oral, Daily  sodium chloride flush 0.9 % injection 5-40 mL, 5-40 mL, IntraVENous, 2 times per day  sodium chloride flush 0.9 % injection 5-40 mL, 5-40 mL, IntraVENous, PRN  0.9 % sodium chloride infusion, , IntraVENous, PRN  enoxaparin (LOVENOX) injection 40 mg, 40 mg, SubCUTAneous, Daily  ondansetron (ZOFRAN-ODT) disintegrating tablet 4 mg, 4 mg, Oral, Q8H PRN **OR** ondansetron (ZOFRAN) injection 4 mg, 4 mg, IntraVENous, Q6H PRN  polyethylene glycol (GLYCOLAX) packet 17 g, 17 g, Oral, Daily PRN  acetaminophen (TYLENOL) tablet 650 mg, 650 mg, Oral, Q6H PRN **OR** acetaminophen (TYLENOL) suppository 650 mg, 650 mg, Rectal, Q6H PRN  Prior to Admission medications    Medication Sig Start Date End Date Taking? Authorizing Provider   inFLIXimab-dyyb (INFLECTRA IV) Infuse intravenously Once every 6 weeks    Historical Provider, MD   pantoprazole (PROTONIX) 40 MG tablet Take 40 mg by mouth daily    Historical Provider, MD   methotrexate (RHEUMATREX) 2.5 MG chemo tablet Take 2.5 mg by mouth once a week Takes 4 weekly    Historical Provider, MD   folic acid (FOLVITE) 1 MG tablet Take 1 mg by mouth daily    Historical Provider, MD   hydroCHLOROthiazide (HYDRODIURIL) 25 MG tablet Take 25 mg by mouth daily    Historical Provider, MD   vitamin B-12 (CYANOCOBALAMIN) 1000 MCG tablet Take 1,000 mcg by mouth daily    Historical Provider, MD   fexofenadine (ALLEGRA ALLERGY) 180 MG tablet Take 180 mg by mouth daily    Historical Provider, MD   Cholecalciferol (VITAMIN D3) 1.25 MG (14403 UT) CAPS Take by mouth    Historical Provider, MD   losartan (COZAAR) 100 MG tablet Take 100 mg by mouth daily    Historical Provider, MD        Allergies:  Amoxicillin    Social History:    reports that he has never smoked. He has never used smokeless tobacco. He reports current alcohol use.     Family History:    History reviewed. No pertinent family history. REVIEW OF SYSTEMS:  CONSTITUTIONAL:  negative  HEENT:  negative  RESPIRATORY:  negative  CARDIOVASCULAR:  negative  GASTROINTESTINAL:  negative  GENITOURINARY:  negative  HEMATOLOGIC/LYMPHATIC:  negative  NEUROLOGICAL:  negative  SKIN: negative    PHYSICAL EXAM:  VITALS:  /76   Pulse 72   Temp 98.4 °F (36.9 °C) (Oral)   Resp 18   Ht 5' 11\" (1.803 m)   Wt 240 lb (108.9 kg)   SpO2 96%   BMI 33.47 kg/m²   24HR INTAKE/OUTPUT:      Intake/Output Summary (Last 24 hours) at 4/7/2022 0912  Last data filed at 4/7/2022 0520  Gross per 24 hour   Intake --   Output 920 ml   Net -920 ml     DRAIN/TUBE OUTPUT:     CONSTITUTIONAL:  alert, no apparent distress and moderately overweight  EYES:  sclera clear  ENT:  normocepalic, without obvious abnormality  NECK:  supple, symmetrical, trachea midline  LUNGS:  clear to auscultation  CARDIOVASCULAR:  regular rate and rhythm and no murmur noted  ABDOMEN: healed scars noted, colostomy on right abdomen, mild prolapse, with peristomal hernia, normal bowel sounds, soft, non-distended, non-tender, voluntary guarding absent, no masses palpated, no hepatosplenomegally and hernia present - stomal region  MUSCULOSKELETAL:  0+ pitting edema lower extremities  NEUROLOGIC:  Mental Status Exam:  Level of Alertness:   awake  Orientation:   person, place, time  SKIN:  no bruising or bleeding, normal skin color, texture, turgor and no redness, warmth, or swelling    DATA:    CBC:   Recent Labs     04/06/22 2020 04/07/22  0426   WBC 14.8* 9.5   HGB 15.6 14.4   HCT 46.2 40.8    235     BMP:    Recent Labs     04/06/22 2020 04/07/22  0426    139   K 3.9 4.0    101   CO2 25 26   BUN 10 8   CREATININE 1.1 0.9   GLUCOSE 110* 105*     Hepatic:   Recent Labs     04/06/22 2020   AST 21   ALT 16   BILITOT 0.5   ALKPHOS 54     Mag:    No results for input(s): MG in the last 72 hours.    Phos:   No results for input(s): PHOS in the last 72 hours.   INR: No results for input(s): INR in the last 72 hours. LIPASE: No results for input(s): LIPASE in the last 72 hours. AMYLASE: No results for input(s): AMYLASE in the last 72 hours. Radiology Review:       CT ABDOMEN PELVIS W IV CONTRAST Additional Contrast? None    Result Date: 4/6/2022  EXAMINATION: CT OF THE ABDOMEN AND PELVIS WITH CONTRAST 4/6/2022 9:14 pm TECHNIQUE: CT of the abdomen and pelvis was performed with the administration of intravenous contrast. Multiplanar reformatted images are provided for review. Dose modulation, iterative reconstruction, and/or weight based adjustment of the mA/kV was utilized to reduce the radiation dose to as low as reasonably achievable. COMPARISON: None. HISTORY: ORDERING SYSTEM PROVIDED HISTORY: post op fever TECHNOLOGIST PROVIDED HISTORY: Reason for exam:->post op fever Additional Contrast?->None Decision Support Exception - unselect if not a suspected or confirmed emergency medical condition->Emergency Medical Condition (MA) Reason for Exam: Fall (Patient states that he was walking to the basement and tripped and fell down approx 14 steps this morning. He endorses loss of consciousness,knot on his head and states that he has thrown up several times throughout the day and is experiencing right sided flank pain.) FINDINGS: Lower Chest: The lung bases are clear. Organs: The liver is borderline enlarged with fatty replacement throughout. The spleen measures 14 cm in length and is normal density. There are a couple of partially calcified masses along the right lobe of the liver superiorly measuring up to 2.4 cm with smaller masses inferiorly and a few calcified masses in the left lobe with the largest laterally measuring 2 cm. The gallbladder, pancreas, and bile ducts are normal.  The adrenals are normal.  The kidneys are normal size and function normally with no hydronephrosis or renal stones. The ureters are normal caliber. GI/bowel:  There is a linear patient's perforation. There is a 3 cm hyperdensity in the stent which could represent enhancing tumor or clot. Recommend correlating with previous endoscopic findings. Probable calcified metastasis throughout the liver. Suggest PET scan correlation. Mild splenomegaly. Large 4-5 cm abdominal wall hernia around the light right lower pelvis with extensive bowel loops throughout the hernia which appear normal caliber with no obvious bowel obstruction or wall thickening seen. Mild prostatic enlargement and diffuse mild bladder wall thickening which may be due to poor distention or an element of bladder outlet obstruction. The findings were discussed with Dr. Mike Dejesus at 10 p.m. EGD    Result Date: 4/6/2022  No dictation     Colonoscopy    Result Date: 4/6/2022  No dictation       IMPRESSION/RECOMMENDATIONS:    Metastatic colon cancer with primary at splenic flexure, with local obstruction due to tumor  Prior colonoscopic stent, with migration and fistulization to stomach  Prior loop TV colostomy, with good diversion, but development of large peristomal hernia over the years  Acute pneumoperitoneum post colonoscopy / bx - likley related to insufflation pressure around stend and fistula region. Has free air but no fluid extravasation noted on review of CT    Emergency exploration not needed at this time  Continue IV atbx today, clears po  If all stable, discharge in am, with follow up at [facility] with Dr. Gian Morton.  If acutely deteriorates would need more emergent surgery here, or inpt transfer (unlikely)     Ultimately pt needs to have left hemicolectomy, partial gastrectomy, repair of peristomal hernia, and either new colonic anastomosis or new site for stoma  Reviewed plans with him, all questions answered, understands and agrees with the plan    Thank you,      Fredrick Wolf MD

## 2022-04-07 NOTE — CONSULTS
Infectious Diseases   Consult Note        Admission Date: 4/6/2022  Hospital Day: Hospital Day: 2   Attending: Nima Esparza MD  Date of service: 4/7/22     Reason for admission: Pneumoperitoneum [K66.8]  Perforated bowel ([address] [K63.1]  Postoperative abdominal pain with fever [R10.9, G89.18, R50.82]    Chief complaint/ Reason for consult: Pneumoperitoneum, fecal peritonitis, streptococcal bacteremia    Microbiology:        I have reviewed allavailable micro lab data and cultures    Blood culture (2/2) - collected on 4/6/2022: Streptococcus species    Antibiotics and immunizations:       Current antibiotics: All antibiotics and their doses were reviewed by me    Recent Abx Admin                   piperacillin-tazobactam (ZOSYN) 3,375 mg in dextrose 5 % 50 mL IVPB extended infusion (mini-bag) (mg) 3,375 mg New Bag 04/07/22 1010     3,375 mg New Bag  0212    metronidazole (FLAGYL) 500 mg in NaCl 100 mL IVPB premix (mg) 500 mg New Bag 04/07/22 0018    ciprofloxacin (CIPRO) IVPB 400 mg (mg) 400 mg New Bag 04/07/22 0014     400 mg New Bag 04/06/22 2251                  Immunization History: All immunization history was reviewed by me today. Immunization History   Administered Date(s) Administered    COVID-19, Pfizer Purple top, DILUTE for use, 12+ yrs, 30mcg/0.3mL dose 03/08/2021, 04/05/2021, 10/23/2021       Known drug allergies: All allergies were reviewed and updated    Allergies   Allergen Reactions    Amoxicillin Hives       Social history:     Social History:  All social andepidemiologic history was reviewed and updated by me today as needed. · Tobacco use:   reports that he has never smoked. He has never used smokeless tobacco.  · Alcohol use:   reports current alcohol use. · Currently lives in: [place] [address]  ·  has no history on file for drug use.      COVID VACCINATION AND LAB RESULT RECORDS:     Internal Administration   First Dose COVID-19, Pfizer Purple top, DILUTE for use, 12+ yrs, 30mcg/0.3mL dose  03/08/2021   Second Dose COVID-19, Pfizer Purple top, DILUTE for use, 12+ yrs, 30mcg/0.3mL dose   04/05/2021       Last COVID Lab SARS-CoV-2 RNA, RT PCR (no units)   Date Value   04/06/2022 NOT DETECTED            Assessment:     The patient is a 46 y.o. old male who  has a past medical history of Arthritis, Cancer ([address] and Hypertension. with following problems:    · Systemic inflammatory response syndrome on admission with leukocytosis, tachycardia  · Streptococcal bacteremia   · Fecal peritonitis  · Bowel perforation  · Metastatic colon cancer with primary of splenic flexure and history of a prior colonoscopic stent with migration of fistulization to the stomach  · S/p colostomy with the development of a large peristomal hernia  · Low-grade fever  · Essential hypertension  · History of amoxicillin allergy  · Obesity Class 1 due to excess calorie intake : Body mass index is 33.47 kg/m². Discussion:      The patient had elevated white cell count of 99,500 and had elevated white cell count of 14,800 on admission. 2 sets of blood cultures were sent on admission which have grown organisms resembling Streptococcus. Images of CT scan of abdomen and pelvis from yesterday reviewed. Pneumoperitoneum concerning for bowel perforation noted. General surgery note reviewed. COVID-19 PCR test done yesterday was negative    Plan:     Diagnostic Workup:    · Follow-up on identification and susceptibility of species of Streptococcus isolated from the blood cultures  · Will order 2D echo to rule out any valvular vegetations.   · Continue to follow fever curve, WBC count and blood cultures  · Follow up on liverand renal functions closely    Antimicrobials:    · Will continue IV Zosyn 3.375 g every 8 hour for empiric gram-negative and anaerobic coverage as well as coverage of streptococcal bacteremia  · Will order IV fluconazole 200 mg every 24 hour  · We will follow up on the culture results and clinical progress and will make further recommendations accordingly. · Continue close vitals monitoring. · Maintain good glycemic control. · Fall precautions. Aspiration precautions. · Continue to watch for new fever or diarrhea. · DVT prophylaxis. · Discussed all above with patient and RN. Drug Monitoring:    · Continue serial monitoring for antibiotic toxicity as follows: CBC, CMP  · Continue to watch for following: new or worsening fever, hypotension, hives, lip swelling and redness or purulence at vascular access sites. I/v access Management:    · Continue to monitor i.v access sites for erythema, induration, discharge or tenderness. · As always, continue efforts to minimizetubes/lines/drains as clinically appropriate to reduce chances of line associated infections. Current isolation precautions: There are no current isolations documented for this patient. Level of complexity of consult: High     Risk of Complications/Morbidity: High     · Illness(es)/ Infection present that pose threat to life/bodily function. · There is potential for severe exacerbation of infection/side effects of treatment. · Therapy requires intensive monitoring for antimicrobial agent toxicity. Thank you for involving me in the care of your patient. I will continue to follow. If you have any additional questions, please do not hesitate to contact me. Subjective:     Presenting complaint in ER:     Chief Complaint   Patient presents with    Post-op Problem     pt states that he had a colonscopy today around 1300. pt states he was fine after, had some food for dinner, then started having chills and a fever of 100.7. pt took 2 500mg tylenol around 1830. HPI: Analia Nino is a 46 y.o. male patient, who was seen at the request of Dr. Coleen Jean MD.    History was obtained from chart review and the patient. The patient was admitted on 4/6/2022.  I have been consulted to see the patient for above mentioned reason(s). The patient has multiple medical comorbidities, and presented to the ER for concerns for a fever. The patient has history of stage IV colon cancer and colostomy and underwent colonoscopy yesterday afternoon. In the evening, he developed fevers and chills. A called his gastroenterologist to advised him to come to the ER to get a CT scan of abdomen and pelvis. In the ER, he was noted to have a low-grade fever 99.5 elevated white cell count of 14,500. Blood cultures were ordered. A CT scan of abdomen and pelvis with IV and p.o. contrast was done which showed moderate intraperitoneal retroperitoneal free air suspicious of a bowel perforation along with surgical stents along the posterior wall of the distal body of the stomach and a 3 cm hypodensity in the spleen concerning for enhancing tumor or clot along with calcified metastases throughout the liver and mild splenomegaly    I have been asked for my opinion for management for this patient. Past Medical History: All past medical history reviewed today. Past Medical History:   Diagnosis Date    Arthritis     rheumatoid    Cancer ([facility]     colon    Hypertension          Past Surgical History: All pastsurgical history was reviewed today. Past Surgical History:   Procedure Laterality Date    ABDOMEN SURGERY      colectomy with ostomy    COLONOSCOPY      COLONOSCOPY N/A 4/6/2022    COLONOSCOPY WITH BIOPSY performed by Olivia Cardenas MD at [address] [place] [address] GASTROINTESTINAL ENDOSCOPY N/A 4/6/2022    EGD BIOPSY performed by Olivia Cardenas MD at [place] [address]         Family History: All family history was reviewed today. History reviewed. No pertinent family history. Medications: All current and past medications were reviewed.     Medications Prior to Admission: inFLIXimab-dyyb (INFLECTRA IV), Infuse intravenously Once every 6 weeks  pantoprazole (PROTONIX) 40 MG tablet, Take 40 mg by mouth daily  methotrexate (RHEUMATREX) 2.5 MG chemo tablet, Take 2.5 mg by mouth once a week Takes 4 weekly  folic acid (FOLVITE) 1 MG tablet, Take 1 mg by mouth daily  hydroCHLOROthiazide (HYDRODIURIL) 25 MG tablet, Take 25 mg by mouth daily  vitamin B-12 (CYANOCOBALAMIN) 1000 MCG tablet, Take 1,000 mcg by mouth daily  fexofenadine (ALLEGRA ALLERGY) 180 MG tablet, Take 180 mg by mouth daily  Cholecalciferol (VITAMIN D3) 1.25 MG (63540 UT) CAPS, Take by mouth  losartan (COZAAR) 100 MG tablet, Take 100 mg by mouth daily     piperacillin-tazobactam  3,375 mg IntraVENous Q8H    losartan  50 mg Oral Daily    sodium chloride flush  5-40 mL IntraVENous 2 times per day    enoxaparin  40 mg SubCUTAneous Daily    fluconazole  200 mg IntraVENous Q24H          REVIEW OF SYSTEMS:       Review of Systems   Constitutional: Positive for fatigue. Negative for chills, diaphoresis and fever. HENT: Negative for ear discharge, ear pain, rhinorrhea, sore throat and trouble swallowing. Eyes: Negative for discharge and redness. Respiratory: Negative for cough, shortness of breath and wheezing. Cardiovascular: Negative for chest pain and leg swelling. Gastrointestinal: Positive for abdominal pain. Negative for constipation, diarrhea and nausea. Endocrine: Negative for polyuria. Genitourinary: Negative for dysuria, flank pain, frequency, hematuria and urgency. Musculoskeletal: Negative for back pain and myalgias. Skin: Negative for rash. Neurological: Negative for dizziness, seizures and headaches. Hematological: Does not bruise/bleed easily. Psychiatric/Behavioral: Negative for hallucinations and suicidal ideas. All other systems reviewed and are negative.         Objective:       PHYSICAL EXAM:      Vitals:   Vitals:    04/06/22 2314 04/07/22 0015 04/07/22 0504 04/07/22 0800   BP: 120/74 118/78 136/72 112/76   Pulse: 82 75 76 72   Resp: 16 19 19 18 Temp:  98.1 °F (36.7 °C) 98.6 °F (37 °C) 98.4 °F (36.9 °C)   TempSrc:  Oral Oral Oral   SpO2: 97% 96% 94% 96%   Weight:  240 lb (108.9 kg)     Height:  5' 11\" (1.803 m)         Physical Exam  Vitals and nursing note reviewed. Constitutional:       Appearance: Normal appearance. He is well-developed. HENT:      Head: Normocephalic and atraumatic. Right Ear: External ear normal.      Left Ear: External ear normal.      Nose: Nose normal. No congestion or rhinorrhea. Mouth/Throat:      Mouth: Mucous membranes are moist.      Pharynx: No oropharyngeal exudate or posterior oropharyngeal erythema. Eyes:      General: No scleral icterus. Right eye: No discharge. Left eye: No discharge. Conjunctiva/sclera: Conjunctivae normal.      Pupils: Pupils are equal, round, and reactive to light. Cardiovascular:      Rate and Rhythm: Normal rate and regular rhythm. Pulses: Normal pulses. Heart sounds: No murmur heard. No friction rub. Pulmonary:      Effort: Pulmonary effort is normal. No respiratory distress. Breath sounds: Normal breath sounds. No stridor. No wheezing, rhonchi or rales. Abdominal:      General: Bowel sounds are normal.      Palpations: Abdomen is soft. Tenderness: There is abdominal tenderness. There is no right CVA tenderness, left CVA tenderness, guarding or rebound. Musculoskeletal:         General: No swelling or tenderness. Normal range of motion. Cervical back: Normal range of motion and neck supple. No rigidity. No muscular tenderness. Lymphadenopathy:      Cervical: No cervical adenopathy. Skin:     General: Skin is warm and dry. Coloration: Skin is not jaundiced. Findings: No erythema or rash. Neurological:      General: No focal deficit present. Mental Status: He is alert and oriented to person, place, and time. Mental status is at baseline. Motor: No abnormal muscle tone.    Psychiatric:         Mood and Affect: Mood normal.         Behavior: Behavior normal.         Thought Content: Thought content normal.         Lines and drains: All vascular access sites are healthy with no local erythema, discharge or tenderness. Intake and output:     I/O last 3 completed shifts:  In: -   Out: 920 [Urine:920]    Lab Data:   All available labs were reviewed by me today. CBC:   Recent Labs     04/06/22 2020 04/07/22 0426   WBC 14.8* 9.5   RBC 4.89 4.32   HGB 15.6 14.4   HCT 46.2 40.8    235   MCV 94.4 94.4   MCH 32.0 33.2   MCHC 33.9 35.2   RDW 13.7 13.8        BMP:  Recent Labs     04/06/22 2020 04/07/22 0426    139   K 3.9 4.0    101   CO2 25 26   BUN 10 8   CREATININE 1.1 0.9   CALCIUM 10.1 9.6   GLUCOSE 110* 105*        Hepatic FunctionPanel:   Lab Results   Component Value Date    ALKPHOS 54 04/06/2022    ALT 16 04/06/2022    AST 21 04/06/2022    PROT 7.5 04/06/2022    BILITOT 0.5 04/06/2022    LABALBU 4.7 04/06/2022       CPK: No results found for: CKTOTAL  ESR: No results found for: SEDRATE  CRP: No results found for: CRP      Imaging: All pertinent images and reports for the current visit were reviewed by me during this visit. I reviewed the chest x-ray/CT scan/MRI images and independently interpreted the findings and results today. CT ABDOMEN PELVIS W IV CONTRAST Additional Contrast? None   Final Result   Moderate intraperitoneal and retroperitoneal free air seen throughout   extending into the pararenal space and into the pelvis which is suspicious   for a bowel perforation. Recommend surgical follow-up. Surgical stent seen along the posterior wall of the distal body of the   stomach extending into the splenic flexure of the colon with some minimal   stranding around the colon in this area extending inferiorly and numerous   small punctate air collections throughout the area extending along the   retroperitoneum and pararenal space.   This could be the etiology of the patient's perforation. There is a 3 cm hyperdensity in the stent which could   represent enhancing tumor or clot. Recommend correlating with previous   endoscopic findings. Probable calcified metastasis throughout the liver. Suggest PET scan   correlation. Mild splenomegaly. Large 4-5 cm abdominal wall hernia around the light right lower pelvis with   extensive bowel loops throughout the hernia which appear normal caliber with   no obvious bowel obstruction or wall thickening seen. Mild prostatic enlargement and diffuse mild bladder wall thickening which may   be due to poor distention or an element of bladder outlet obstruction. The findings were discussed with Dr. Vik Isaac at 10 p.m. Outside records:    Labs, Microbiology, Radiology and pertinent results from Care everywhere, if available, were reviewed as a part ofthe consultation. Problem list:       Patient Active Problem List   Diagnosis Code    Pneumoperitoneum K66.8    Perforated bowel ([facility] K63.1    Postoperative abdominal pain with fever R10.9, G89.18, R50.82    Fecal peritonitis ([address] K65.8    SIRS (systemic inflammatory response syndrome) ([address] R65.10    Streptococcal bacteremia R78.81, B95.5    Colon cancer metastasized to liver ([facility] C18.9, C78.7    Low grade fever R50.9    Essential hypertension I10    Class 1 obesity due to excess calories with body mass index (BMI) of 33.0 to 33.9 in adult E66.09, Z68.33         Please note that this chart was generated using Dragon dictation software. Although every effort was made to ensure the accuracy of this automated transcription, some errors in transcription may have occurred inadvertently. If you may need any clarification, please do not hesitate to contact me through EPIC or at the phone number provided below with my electronic signature.   Any pictures or media included in this note were obtained after taking informed verbal consent from the patient and with their approval to include those in the patient's medical record.         Charles Beltrán MD, MPH, HARISH Johnson  4/7/2022, 1:35 PM  [facility] Infectious Disease   [facility] [address] [facility], [address] [place] [address]  Office: [phone]  Fax: [phone]  Clinic days:  Tuesday & Thursday

## 2022-04-07 NOTE — CARE COORDINATION
Discharge Planning:     (CM) reviewed the patient's chart to assess needs. Patient's Readmission Risk Score is 4%. Patient's medical insurance is [employer]. Patient's PCP is Dr. Kimberly Gonzalez. No needs anticipated, at this time. CM team to follow. Staff to inform CM if additional discharge needs arise.         Ness PACHECO, [place], [facility] -   [phone]    Electronically signed by TARA Jack on 4/7/2022 at 10:29 AM

## 2022-04-07 NOTE — H&P
HOSPITALISTS HISTORY AND PHYSICAL    04/06/22  Patient Information:  Mayte Zazueta is a 46 y.o. male [id number]  PCP:  Magui Urias MD (Tel: [phone] )    Chief complaint:    Chief Complaint   Patient presents with    Post-op Problem     pt states that he had a colonscopy today around 1300. pt states he was fine after, had some food for dinner, then started having chills and a fever of 100.7. pt took 2 500mg tylenol around 1830. History of Present Illness:  Antony Peralta is a 46 y.o. male who presented with  With c/o fever. Chills. He has h/o colon CA and had colostomy. He underwent  colonoscopy with Dr. Vik Isaac of [facility] earlier today . He was dc in stable condition . States started feeling chills and spiked fever when he was having dinner. Reports fever of 100.7  CT abdomen and pelvis showed pneumoperitoneum   GI was notified who advised admission and surgical consult/ thept has been started on IV antibiotics       History obtained and . Old medical records reviewed        REVIEW OF SYSTEMS:   Constitutional: Negative for fever,chills or night sweats  ENT: Negative for rhinorrhea, epistaxis, hoarseness, sore throat. Respiratory: Negative for shortness of breath,wheezing  Cardiovascular: Negative for chest pain, palpitations   Gastrointestinal: Negative for nausea, vomiting, diarrhea  Genitourinary: Negative for polyuria, dysuria   Hematologic/Lymphatic: Negative for bleeding tendency, easy bruising  Musculoskeletal: Negative for myalgias and arthralgias  Neurologic: Negative for confusion,dysarthria. Skin: Negative for itching,rash  Psychiatric: Negative for depression,anxiety, agitation. Endocrine: Negative for polydipsia,polyuria,heat /cold intolerance. Past Medical History:   has a past medical history of Arthritis, Cancer ([address] and Hypertension.      Past Surgical History:   has a past surgical history that includes Abdomen surgery; Colonoscopy; [address] Surgery; Colonoscopy (N/A, 4/6/2022); and Upper gastrointestinal endoscopy (N/A, 4/6/2022). Medications:  No current facility-administered medications on file prior to encounter.      Current Outpatient Medications on File Prior to Encounter   Medication Sig Dispense Refill    inFLIXimab-dyyb (INFLECTRA IV) Infuse intravenously Once every 6 weeks      pantoprazole (PROTONIX) 40 MG tablet Take 40 mg by mouth daily      methotrexate (RHEUMATREX) 2.5 MG chemo tablet Take 2.5 mg by mouth once a week Takes 4 weekly      folic acid (FOLVITE) 1 MG tablet Take 1 mg by mouth daily      hydroCHLOROthiazide (HYDRODIURIL) 25 MG tablet Take 25 mg by mouth daily      vitamin B-12 (CYANOCOBALAMIN) 1000 MCG tablet Take 1,000 mcg by mouth daily      fexofenadine (ALLEGRA ALLERGY) 180 MG tablet Take 180 mg by mouth daily      Cholecalciferol (VITAMIN D3) 1.25 MG (23602 UT) CAPS Take by mouth      losartan (COZAAR) 100 MG tablet Take 100 mg by mouth daily       Current Facility-Administered Medications   Medication Dose Route Frequency Provider Last Rate Last Admin    piperacillin-tazobactam (ZOSYN) 3,375 mg in dextrose 5 % 50 mL IVPB extended infusion (mini-bag)  3,375 mg IntraVENous Q8H Nikole Romero MD        0.9 % sodium chloride infusion   IntraVENous Continuous Artem Adkins  mL/hr at 04/07/22 0041 New Bag at 04/07/22 0041    morphine injection 4 mg  4 mg IntraVENous Q4H PRN Artem Adkins MD        losartan (COZAAR) tablet 50 mg  50 mg Oral Daily Artem Adkins MD        hydroCHLOROthiazide (HYDRODIURIL) tablet 25 mg  25 mg Oral Daily Artem Adkins MD        sodium chloride flush 0.9 % injection 5-40 mL  5-40 mL IntraVENous 2 times per day Artem Adkins MD        sodium chloride flush 0.9 % injection 5-40 mL  5-40 mL IntraVENous PRN Artem Adkins MD        0.9 % sodium chloride infusion   IntraVENous PRN Nikole Angulo, MD        enoxaparin (LOVENOX) injection 40 mg  40 mg SubCUTAneous Daily Nikole Romero MD        ondansetron (ZOFRAN-ODT) disintegrating tablet 4 mg  4 mg Oral Q8H PRN Kellie Chaudhary MD        Or    ondansetron (ZOFRAN) injection 4 mg  4 mg IntraVENous Q6H PRN Kellie Chaudhary MD        polyethylene glycol (GLYCOLAX) packet 17 g  17 g Oral Daily PRN Kellie Chaudhary MD        acetaminophen (TYLENOL) tablet 650 mg  650 mg Oral Q6H PRN Kellie Chaudhary MD        Or   Lona Bueno acetaminophen (TYLENOL) suppository 650 mg  650 mg Rectal Q6H PRN Kellie Chaudhary MD        metronidazole (FLAGYL) 500 mg in NaCl 100 mL IVPB premix  500 mg IntraVENous Once [facility] 100 mL/hr at 04/07/22 0018 500 mg at 04/07/22 0018       Allergies: Allergies   Allergen Reactions    Amoxicillin Hives        Social History:  Patient Lives    reports that he has never smoked. He has never used smokeless tobacco. He reports current alcohol use. Family History:  family history is not on file. ,    Physical Exam:  /78   Pulse 75   Temp 98.1 °F (36.7 °C) (Oral)   Resp 19   Ht 5' 11\" (1.803 m)   Wt 240 lb (108.9 kg)   SpO2 96%   BMI 33.47 kg/m²     General appearance:  Appears comfortable. Well nourished  Eyes: Sclera clear, pupils equal  ENT: Moist mucus membranes, no thrush. Trachea midline. Cardiovascular: Regular rhythm, normal S1, S2. No murmur, gallop, rub. No edema in lower extremities  Respiratory: Clear to auscultation bilaterally, no wheeze, good inspiratory effort  Gastrointestinal: Abdomen soft, non-tender, not distended, normal bowel sounds  Musculoskeletal: No cyanosis in digits, neck supple  Neurology: Cranial nerves grossly intact. Alert and oriented in time, place and person. No speech or motor deficits  Psychiatry: Appropriate affect.  Not agitated  Skin: Warm, dry, normal turgor, no rash  Brisk capillary refill, peripheral pulses palpable   Labs:  CBC:   Lab Results   Component Value Date    WBC 14.8 04/06/2022    RBC 4.89 04/06/2022    HGB 15.6 04/06/2022    HCT 46.2 04/06/2022    MCV 94.4 04/06/2022    MCH 32.0 04/06/2022    MCHC 33.9 04/06/2022    RDW 13.7 04/06/2022     04/06/2022    MPV 7.6 04/06/2022     BMP:    Lab Results   Component Value Date     04/06/2022    K 3.9 04/06/2022     04/06/2022    CO2 25 04/06/2022    BUN 10 04/06/2022    CREATININE 1.1 04/06/2022    CALCIUM 10.1 04/06/2022    GFRAA >60 04/06/2022    LABGLOM >60 04/06/2022    GLUCOSE 110 04/06/2022     CT ABDOMEN PELVIS W IV CONTRAST Additional Contrast? None   Final Result   Moderate intraperitoneal and retroperitoneal free air seen throughout   extending into the pararenal space and into the pelvis which is suspicious   for a bowel perforation. Recommend surgical follow-up. Surgical stent seen along the posterior wall of the distal body of the   stomach extending into the splenic flexure of the colon with some minimal   stranding around the colon in this area extending inferiorly and numerous   small punctate air collections throughout the area extending along the   retroperitoneum and pararenal space. This could be the etiology of the   patient's perforation. There is a 3 cm hyperdensity in the stent which could   represent enhancing tumor or clot. Recommend correlating with previous   endoscopic findings. Probable calcified metastasis throughout the liver. Suggest PET scan   correlation. Mild splenomegaly. Large 4-5 cm abdominal wall hernia around the light right lower pelvis with   extensive bowel loops throughout the hernia which appear normal caliber with   no obvious bowel obstruction or wall thickening seen. Mild prostatic enlargement and diffuse mild bladder wall thickening which may   be due to poor distention or an element of bladder outlet obstruction. The findings were discussed with Dr. Joesph Bradley at 10 p.m.            Chest Xray:   EKG:    I visualized CXR images and EKG strips    Discussed case  with     Problem List  Principal Problem:    Pneumoperitoneum  Resolved Problems:    * No resolved hospital problems. *        Assessment/Plan:   Pneumoperitoneum pt is s/p EGD   Bowel rest  IV fluids   IV zosyn  Cultures pending   GI And Surgery have been consulted    DVT prophylaxis SCD s  Code status full   Diet   IV access   Slaughter Catheter    Admit as inpatient. I anticipate hospitalization spanning more than two midnights for investigation and treatment of the above medically necessary diagnoses. Please note that some part of this chart was generated using Dragon dictation software. Although every effort was made to ensure the accuracy of this automated transcription, some errors in transcription may have occurred inadvertently. If you may need any clarification, please do not hesitate to contact me through [facility].        Artem Adkins MD    04/06/22

## 2022-04-07 NOTE — PROGRESS NOTES
Gastroenterology Progress Note      Early Kathryn is a 46 y.o. male patient. Principal Problem:    Pneumoperitoneum  Active Problems:    Perforated bowel (HCC)    Postoperative abdominal pain with fever    Fecal peritonitis ([address]    SIRS (systemic inflammatory response syndrome) (HCC)    Streptococcal bacteremia    Colon cancer metastasized to liver (HCC)    Low grade fever    Essential hypertension    Class 1 obesity due to excess calories with body mass index (BMI) of 33.0 to 33.9 in adult  Resolved Problems:    * No resolved hospital problems. *      SUBJECTIVE: Patient is doing better today    ROS:  No fever, chills  No chest pain, palpitations  No SOB, cough  Gastrointestinal ROS: no abdominal pain, nausea, vomiting     Physical    VITALS:  /77   Pulse 73   Temp 98.3 °F (36.8 °C) (Oral)   Resp 18   Ht 5' 11\" (1.803 m)   Wt 240 lb (108.9 kg)   SpO2 97%   BMI 33.47 kg/m²   TEMPERATURE:  Current - Temp: 98.3 °F (36.8 °C); Max - Temp  Av.6 °F (37 °C)  Min: 98.1 °F (36.7 °C)  Max: 99.5 °F (37.5 °C)    NAD  RRR, Nl s1s2  Lungs CTA Bilaterally, normal effort  Abdomen soft, ND, NT, no HSM, Bowel sounds normal.    Data    Data Review:    Recent Labs     226   WBC 14.8* 9.5   HGB 15.6 14.4   HCT 46.2 40.8   MCV 94.4 94.4    235     Recent Labs     226    139   K 3.9 4.0    101   CO2 25 26   BUN 10 8   CREATININE 1.1 0.9     Recent Labs     22   AST 21   ALT 16   BILITOT 0.5   ALKPHOS 54     No results for input(s): LIPASE, AMYLASE in the last 72 hours. No results for input(s): PROTIME, INR in the last 72 hours. No results for input(s): PTT in the last 72 hours.     Radiology Review:      ASSESSMENT this is a very pleasant 59-year-old male who had a colonoscopy by Dr. Vik Isaac yesterday he came to the emergency room with a fever he was found to have bacteremia    He is been seen by infectious disease specialist today he is also been seen by general surgery    He has been on a clear liquid diet          PLAN    Continue IV antibiotics secondary to the bacteremia  Continue on a clear liquid diet for now the patient continues to improve then we will advance his diet as tolerated tomorrow    We are going slow on the diet due to the pneumoperitoneum and in hopes that he continues on a upward trajectory as far as his pain and fever curve goes so that he is not having any issues if he is not having any issues then will feel more comfortable giving a regular diet    Because we do not want him to have to go to emergency surgery and have food in the peritoneum    He also needs to continue the IV antibiotics as I mentioned earlier due to the bacteremia    Thank you for this very kind referral    MD Bridgette Vasquez

## 2022-04-07 NOTE — ED PROVIDER NOTES
[facility]        Pt Name: Nara Saab  MRN: [id number]  [place] 1970  Date of evaluation: 4/6/2022  Provider: Marlowe Peabody, PA-C  PCP: Remy Davis MD  Note Started: 8:11 PM EDT        I have seen and evaluated this patient with my supervising physician Shane Vasquez MD.    [facility]       Chief Complaint   Patient presents with    Post-op Problem     pt states that he had a colonscopy today around 1300. pt states he was fine after, had some food for dinner, then started having chills and a fever of 100.7. pt took 2 500mg tylenol around 1830. HISTORY OF PRESENT ILLNESS   (Location, Timing/Onset, Context/Setting, Quality, Duration, Modifying Factors, Severity, Associated Signs and Symptoms)  Note limiting factors. Chief Complaint: Fever     Nara Saab is a 46 y.o. male who presents valuation of postoperative fever. Patient has a history of stage IV colon cancer and has a colostomy. States that he followed up with routine screening and had colonoscopy performed around 1 PM this afternoon with Dr. Julián Bello. Patient states that this evening he developed fever and chills T-max 100.7 took Tylenol. States he called the gastroenterologist recommended him come to the ED to get a CAT scan. Patient denies any other symptoms. No abdominal pain nausea vomiting or diarrhea. No cough congestion runny nose pain no chest pain or shortness of breath. He has no other complaints or concerns at this time. Nursing Notes were all reviewed and agreed with or any disagreements were addressed in the HPI. REVIEW OF SYSTEMS    (2-9 systems for level 4, 10 or more for level 5)     Review of Systems   Constitutional: Positive for chills and fever. Negative for appetite change. HENT: Negative for congestion and rhinorrhea. Respiratory: Negative for cough, shortness of breath and wheezing.     Cardiovascular: Negative for chest pain. Gastrointestinal: Negative for abdominal pain, diarrhea, nausea and vomiting. Genitourinary: Negative for difficulty urinating, dysuria and hematuria. Musculoskeletal: Negative for neck pain and neck stiffness. Skin: Negative for rash. Neurological: Negative for headaches. Positives and Pertinent negatives as per HPI. Except as noted above in the ROS, all other systems were reviewed and negative. PAST MEDICAL HISTORY     Past Medical History:   Diagnosis Date    Arthritis     rheumatoid    Cancer [address] [address]     colon    Hypertension          SURGICAL HISTORY     Past Surgical History:   Procedure Laterality Date    ABDOMEN SURGERY      colectomy with ostomy    COLONOSCOPY      COLONOSCOPY N/A 4/6/2022    COLONOSCOPY WITH BIOPSY performed by Luis Enrique Monroe MD at [address] [place] [address] GASTROINTESTINAL ENDOSCOPY N/A 4/6/2022    EGD BIOPSY performed by Luis Enrique Monroe MD at [place] [address]       Previous Medications    CHOLECALCIFEROL (VITAMIN D3) 1.25 MG (55999 UT) CAPS    Take by mouth    FEXOFENADINE (ALLEGRA ALLERGY) 180 MG TABLET    Take 180 mg by mouth daily    FOLIC ACID (FOLVITE) 1 MG TABLET    Take 1 mg by mouth daily    HYDROCHLOROTHIAZIDE (HYDRODIURIL) 25 MG TABLET    Take 25 mg by mouth daily    INFLIXIMAB-DYYB (INFLECTRA IV)    Infuse intravenously Once every 6 weeks    LOSARTAN (COZAAR) 100 MG TABLET    Take 100 mg by mouth daily    METHOTREXATE (RHEUMATREX) 2.5 MG CHEMO TABLET    Take 2.5 mg by mouth once a week Takes 4 weekly    PANTOPRAZOLE (PROTONIX) 40 MG TABLET    Take 40 mg by mouth daily    VITAMIN B-12 (CYANOCOBALAMIN) 1000 MCG TABLET    Take 1,000 mcg by mouth daily         ALLERGIES     Amoxicillin    FAMILYHISTORY     History reviewed. No pertinent family history.        SOCIAL HISTORY       Social History     Tobacco Use    Smoking status: Never Smoker    Smokeless tobacco: Never Used Substance Use Topics    Alcohol use: Yes     Comment: rarely    Drug use: Not on file       SCREENINGS    Dom Coma Scale  Eye Opening: Spontaneous  Best Verbal Response: Oriented  Best Motor Response: Obeys commands  Dom Coma Scale Score: 15        PHYSICAL EXAM    (up to 7 for level 4, 8 or more for level 5)     ED Triage Vitals [04/06/22 2007]   BP Temp Temp Source Pulse Resp SpO2 Height Weight   -- 99.5 °F (37.5 °C) Oral 126 18 95 % -- --       Physical Exam  Vitals and nursing note reviewed. Constitutional:       Appearance: He is well-developed. He is not diaphoretic. HENT:      Head: Normocephalic and atraumatic. Right Ear: External ear normal.      Left Ear: External ear normal.      Nose: Nose normal.   Eyes:      General:         Right eye: No discharge. Left eye: No discharge. Cardiovascular:      Rate and Rhythm: Regular rhythm. Tachycardia present. Heart sounds: Normal heart sounds. Pulmonary:      Effort: Pulmonary effort is normal. No respiratory distress. Breath sounds: Normal breath sounds. Chest:      Chest wall: No tenderness. Abdominal:      General: There is no distension. Palpations: Abdomen is soft. Tenderness: There is no abdominal tenderness. There is no guarding or rebound. Comments: Colostomy    Musculoskeletal:         General: Normal range of motion. Cervical back: Normal range of motion and neck supple. Skin:     General: Skin is warm and dry. Neurological:      Mental Status: He is alert and oriented to person, place, and time.    Psychiatric:         Behavior: Behavior normal.         DIAGNOSTIC RESULTS   LABS:    Labs Reviewed   CBC WITH AUTO DIFFERENTIAL - Abnormal; Notable for the following components:       Result Value    WBC 14.8 (*)     Neutrophils Absolute 12.6 (*)     All other components within normal limits   COMPREHENSIVE METABOLIC PANEL - Abnormal; Notable for the following components:    Glucose 110 (*) All other components within normal limits   CULTURE, BLOOD 1   CULTURE, BLOOD 2   COVID-19 & INFLUENZA COMBO   LACTATE, SEPSIS       When ordered only abnormal lab results are displayed. All other labs were within normal range or not returned as of this dictation. EKG: When ordered, EKG's are interpreted by the Emergency Department Physician in the absence of a cardiologist.  Please see their note for interpretation of EKG. RADIOLOGY:   Non-plain film images such as CT, Ultrasound and MRI are read by the radiologist. Plain radiographic images are visualized and preliminarily interpreted by the ED Provider with the below findings:        Interpretation per the Radiologist below, if available at the time of this note:    CT ABDOMEN PELVIS W IV CONTRAST Additional Contrast? None   Final Result   Moderate intraperitoneal and retroperitoneal free air seen throughout   extending into the pararenal space and into the pelvis which is suspicious   for a bowel perforation. Recommend surgical follow-up. Surgical stent seen along the posterior wall of the distal body of the   stomach extending into the splenic flexure of the colon with some minimal   stranding around the colon in this area extending inferiorly and numerous   small punctate air collections throughout the area extending along the   retroperitoneum and pararenal space. This could be the etiology of the   patient's perforation. There is a 3 cm hyperdensity in the stent which could   represent enhancing tumor or clot. Recommend correlating with previous   endoscopic findings. Probable calcified metastasis throughout the liver. Suggest PET scan   correlation. Mild splenomegaly. Large 4-5 cm abdominal wall hernia around the light right lower pelvis with   extensive bowel loops throughout the hernia which appear normal caliber with   no obvious bowel obstruction or wall thickening seen.       Mild prostatic enlargement and diffuse mild bladder wall thickening which may   be due to poor distention or an element of bladder outlet obstruction. The findings were discussed with Dr. RAMÍREZ [facility] at 10 p.m. EGD    Result Date: 4/6/2022  No dictation     Colonoscopy    Result Date: 4/6/2022  No dictation           PROCEDURES   Unless otherwise noted below, none     Procedures    CRITICAL CARE TIME   There was a high probability of life-threatening clinical deterioration in the patient's condition requiring my urgent intervention. I personally saw the patient and independently provided 41 minutes of non-concurrent critical care out of the total shared critical care time provided, excluding separately reportable procedures. CONSULTS:  None      EMERGENCY DEPARTMENT COURSE and DIFFERENTIAL DIAGNOSIS/MDM:   Vitals:    Vitals:    04/06/22 2007 04/06/22 2011   BP:  121/66   Pulse: 126    Resp: 18    Temp: 99.5 °F (37.5 °C)    TempSrc: Oral    SpO2: 95%        Patient was given the following medications:  Medications   ciprofloxacin (CIPRO) IVPB 400 mg (has no administration in time range)   metronidazole (FLAGYL) 500 mg in NaCl 100 mL IVPB premix (has no administration in time range)   0.9 % sodium chloride bolus (has no administration in time range)   iopamidol (ISOVUE-370) 76 % injection 75 mL (75 mLs IntraVENous Given 4/6/22 2127)           Patient presents for evaluation of postop fever. On exam, he is resting comfortably in bed no acute distress nontoxic. He is tachycardic at 126 with a temp of 99 5. Lungs are clear to auscultation bilaterally. Abdomen is benign. CBC and CMP are remarkable for leukocytosis of 14.8. Lactic acid 1.0. Blood cultures are pending. CT of abdomen and pelvis does show moderate intraperitoneal and retroperitoneal free air suspicious for bowel perforation. Surgical stent seen along the posterior wall of the distal body of stomach extending into the splenic flexure of the colon with mild stranding.   There is also small punctate air collections throughout that area extending along the retroperitoneum and pararenal space that could be the etiology of patient's perforation. Call placed out to gastroenterology, Dr. Ana Arriaza will consult the patient in the morning as well as general surgery. Hospitalist resume care of the patient at this time. Patient was started on fluid resuscitation as well as broad-spectrum antibiotic therapy. Hospitalist to resume care the patient at this time. Patient was informed and agreeable. Stable for admission. FINAL IMPRESSION      1. Perforated bowel ([address]    2. Postoperative abdominal pain with fever          DISPOSITION/PLAN   DISPOSITION        PATIENT REFERRED TO:  No follow-up provider specified.     DISCHARGE MEDICATIONS:  New Prescriptions    No medications on file       DISCONTINUED MEDICATIONS:  Discontinued Medications    No medications on file              (Please note that portions of this note were completed with a voice recognition program.  Efforts were made to edit the dictations but occasionally words are mis-transcribed.)    Sheridan Guerin PA-C (electronically signed)           Preston Woods PA-C  04/06/22 2109

## 2022-04-07 NOTE — PROGRESS NOTES
I reviewed results of CT with Radiology. There are locules of air in peritoneum and retroperitoneum seemingly centered around the prior colonic stent which traverses the stomach. No free fluid. Suspect this air dissected along tissue planes during the EGD/colonoscopy as the fractured stent and a fistula could be seen in the stomach on EGD today. Discussed with Dr Anthony Kay his surgeon at [facility], recommend keeping the patient overnight on IV Zosyn and NPO. Would recommend surgery see the patient as well. He does have an appointment with Dr Anthony Kay on Friday to discuss surgery. Recheck labs in am. I explained this to patient as well.

## 2022-04-07 NOTE — PROGRESS NOTES
Hospitalist Progress Note      PCP: Madison Garcia MD    Date of Admission: 4/6/2022    Chief Complaint: Fever    Hospital Course: Patient with a history of colon cancer status post colostomy came in with fevers . Had colonoscopy at [facility] the day before. Found to have pneumoperitoneum. Neurosurgery consulted. Started on IV antibiotics. Subjective: Tolerating clears okay. No abdominal pain. Wife at bedside. Medications:  Reviewed    Infusion Medications    sodium chloride 100 mL/hr at 04/07/22 0041    sodium chloride       Scheduled Medications    piperacillin-tazobactam  3,375 mg IntraVENous Q8H    losartan  50 mg Oral Daily    sodium chloride flush  5-40 mL IntraVENous 2 times per day    enoxaparin  40 mg SubCUTAneous Daily    fluconazole  200 mg IntraVENous Q24H     PRN Meds: morphine, sodium chloride flush, sodium chloride, ondansetron **OR** ondansetron, polyethylene glycol, acetaminophen **OR** acetaminophen      Intake/Output Summary (Last 24 hours) at 4/7/2022 1441  Last data filed at 4/7/2022 0520  Gross per 24 hour   Intake --   Output 920 ml   Net -920 ml       Physical Exam Performed:    /76   Pulse 72   Temp 98.4 °F (36.9 °C) (Oral)   Resp 18   Ht 5' 11\" (1.803 m)   Wt 240 lb (108.9 kg)   SpO2 96%   BMI 33.47 kg/m²     General appearance: No apparent distress, appears stated age and cooperative. HEENT: Pupils equal, round, and reactive to light. Conjunctivae/corneas clear. Neck: Supple, with full range of motion. No jugular venous distention. Trachea midline. Respiratory:  Normal respiratory effort. Clear to auscultation, bilaterally without Rales/Wheezes/Rhonchi. Cardiovascular: Regular rate and rhythm with normal S1/S2 without murmurs, rubs or gallops. Abdomen: Soft, non-tender, colostomy bag in the center. With normal bowel sounds. Musculoskeletal: No clubbing, cyanosis or edema bilaterally. Full range of motion without deformity.   Skin: Skin color, texture, turgor normal.  No rashes or lesions. Neurologic:  Neurovascularly intact without any focal sensory/motor deficits. Cranial nerves: II-XII intact, grossly non-focal.  Psychiatric: Alert and oriented, thought content appropriate, normal insight  Capillary Refill: Brisk,3 seconds, normal   Peripheral Pulses: +2 palpable, equal bilaterally       Labs:   Recent Labs     04/06/22 2020 04/07/22 0426   WBC 14.8* 9.5   HGB 15.6 14.4   HCT 46.2 40.8    235     Recent Labs     04/06/22 2020 04/07/22 0426    139   K 3.9 4.0    101   CO2 25 26   BUN 10 8   CREATININE 1.1 0.9   CALCIUM 10.1 9.6     Recent Labs     04/06/22 2020   AST 21   ALT 16   BILITOT 0.5   ALKPHOS 54     No results for input(s): INR in the last 72 hours. No results for input(s): Fidelia Quintana in the last 72 hours. Urinalysis:    No results found for: Gabriela Schirmer, [address], BACTERIA, RBCUA, BLOODU, [address], [place] [address]    Radiology:  CT ABDOMEN PELVIS W IV CONTRAST Additional Contrast? None   Final Result   Moderate intraperitoneal and retroperitoneal free air seen throughout   extending into the pararenal space and into the pelvis which is suspicious   for a bowel perforation. Recommend surgical follow-up. Surgical stent seen along the posterior wall of the distal body of the   stomach extending into the splenic flexure of the colon with some minimal   stranding around the colon in this area extending inferiorly and numerous   small punctate air collections throughout the area extending along the   retroperitoneum and pararenal space. This could be the etiology of the   patient's perforation. There is a 3 cm hyperdensity in the stent which could   represent enhancing tumor or clot. Recommend correlating with previous   endoscopic findings. Probable calcified metastasis throughout the liver. Suggest PET scan   correlation. Mild splenomegaly.       Large 4-5 cm abdominal wall hernia around the light right lower pelvis with   extensive bowel loops throughout the hernia which appear normal caliber with   no obvious bowel obstruction or wall thickening seen. Mild prostatic enlargement and diffuse mild bladder wall thickening which may   be due to poor distention or an element of bladder outlet obstruction. The findings were discussed with Dr. Aleida Shannon at 10 p.m. Assessment/Plan:    Active Hospital Problems    Diagnosis     Perforated bowel ([facility] [K63.1]     Postoperative abdominal pain with fever [R10.9, G89.18, R50.82]     Fecal peritonitis (HCC) [K65.8]     SIRS (systemic inflammatory response syndrome) (HCC) [R65.10]     Streptococcal bacteremia [R78.81, B95.5]     Colon cancer metastasized to liver ([facility] [C18.9, C78.7]     Low grade fever [R50.9]     Essential hypertension [I10]     Class 1 obesity due to excess calories with body mass index (BMI) of 33.0 to 33.9 in adult [E66.09, Z68.33]     Pneumoperitoneum [K66.8]      Pneumoperitoneum: History of recent colonoscopy. CT abdomen noted with migration of the colonic stent into the stomach, possible etiology. General surgery consulted. Started on clears. No interventions planned at this time. Strep bacteremia: 2 x 2 cultures positive. On IV Zosyn. ID consulted. Diflucan added. History of colon cancer status post colostomy, radiation, chemotherapy    Hypertension: Continue Cozaar. Discontinue hydrochlorothiazide. History of rheumatoid arthritis    DVT Prophylaxis: Lovenox  Diet: ADULT DIET;  Clear Liquid  Code Status: Full Code    PT/OT Eval Status: Not needed    Dispo -inpatient 2 days    Martita Alexis MD

## 2022-04-08 LAB
HCT VFR BLD CALC: 41.4 % (ref 40.5–52.5)
HEMOGLOBIN: 14.3 G/DL (ref 13.5–17.5)
LV EF: 50 %
LVEF MODALITY: NORMAL
MCH RBC QN AUTO: 32.5 PG (ref 26–34)
MCHC RBC AUTO-ENTMCNC: 34.6 G/DL (ref 31–36)
MCV RBC AUTO: 94 FL (ref 80–100)
PDW BLD-RTO: 13.9 % (ref 12.4–15.4)
PLATELET # BLD: 248 K/UL (ref 135–450)
PMV BLD AUTO: 7.7 FL (ref 5–10.5)
RBC # BLD: 4.4 M/UL (ref 4.2–5.9)
WBC # BLD: 6 K/UL (ref 4–11)

## 2022-04-08 PROCEDURE — 6360000002 HC RX W HCPCS: Performed by: FAMILY MEDICINE

## 2022-04-08 PROCEDURE — 94760 N-INVAS EAR/PLS OXIMETRY 1: CPT

## 2022-04-08 PROCEDURE — 85027 COMPLETE CBC AUTOMATED: CPT

## 2022-04-08 PROCEDURE — 2580000003 HC RX 258: Performed by: FAMILY MEDICINE

## 2022-04-08 PROCEDURE — 93306 TTE W/DOPPLER COMPLETE: CPT

## 2022-04-08 PROCEDURE — APPNB30 APP NON BILLABLE TIME 0-30 MINS: Performed by: NURSE PRACTITIONER

## 2022-04-08 PROCEDURE — 6360000002 HC RX W HCPCS: Performed by: INTERNAL MEDICINE

## 2022-04-08 PROCEDURE — 6370000000 HC RX 637 (ALT 250 FOR IP): Performed by: FAMILY MEDICINE

## 2022-04-08 PROCEDURE — 87040 BLOOD CULTURE FOR BACTERIA: CPT

## 2022-04-08 PROCEDURE — 99232 SBSQ HOSP IP/OBS MODERATE 35: CPT | Performed by: SURGERY

## 2022-04-08 PROCEDURE — 99233 SBSQ HOSP IP/OBS HIGH 50: CPT | Performed by: INTERNAL MEDICINE

## 2022-04-08 PROCEDURE — APPSS15 APP SPLIT SHARED TIME 0-15 MINUTES: Performed by: NURSE PRACTITIONER

## 2022-04-08 PROCEDURE — 1200000000 HC SEMI PRIVATE

## 2022-04-08 PROCEDURE — 36415 COLL VENOUS BLD VENIPUNCTURE: CPT

## 2022-04-08 RX ADMIN — FLUCONAZOLE 200 MG: 2 INJECTION, SOLUTION INTRAVENOUS at 16:43

## 2022-04-08 RX ADMIN — PIPERACILLIN AND TAZOBACTAM 3375 MG: 3; .375 INJECTION, POWDER, LYOPHILIZED, FOR SOLUTION INTRAVENOUS at 10:03

## 2022-04-08 RX ADMIN — PIPERACILLIN AND TAZOBACTAM 3375 MG: 3; .375 INJECTION, POWDER, LYOPHILIZED, FOR SOLUTION INTRAVENOUS at 18:17

## 2022-04-08 RX ADMIN — SODIUM CHLORIDE 25 ML: 9 INJECTION, SOLUTION INTRAVENOUS at 09:53

## 2022-04-08 RX ADMIN — PIPERACILLIN AND TAZOBACTAM 3375 MG: 3; .375 INJECTION, POWDER, LYOPHILIZED, FOR SOLUTION INTRAVENOUS at 02:07

## 2022-04-08 RX ADMIN — ENOXAPARIN SODIUM 40 MG: 40 INJECTION SUBCUTANEOUS at 11:43

## 2022-04-08 RX ADMIN — SODIUM CHLORIDE, PRESERVATIVE FREE 10 ML: 5 INJECTION INTRAVENOUS at 11:44

## 2022-04-08 RX ADMIN — LOSARTAN POTASSIUM 50 MG: 25 TABLET, FILM COATED ORAL at 11:44

## 2022-04-08 ASSESSMENT — ENCOUNTER SYMPTOMS
EYE DISCHARGE: 0
SHORTNESS OF BREATH: 0
TROUBLE SWALLOWING: 0
SORE THROAT: 0
CONSTIPATION: 0
NAUSEA: 0
RHINORRHEA: 0
DIARRHEA: 0
WHEEZING: 0
EYE REDNESS: 0
BACK PAIN: 0
COUGH: 0
ABDOMINAL PAIN: 0

## 2022-04-08 ASSESSMENT — PAIN SCALES - GENERAL: PAINLEVEL_OUTOF10: 0

## 2022-04-08 NOTE — CARE COORDINATION
Discharge Planning:     (CM) called Miriam at [address] [place] [address] ([phone]) and left a voicemail requesting that she run patient's home IV benefits.       Jacobo PACHECO, [facility] -   [phone]    Electronically signed by TARA Landeros on 4/8/2022 at 10:40 AM

## 2022-04-08 NOTE — PLAN OF CARE
Problem: Pain:  Goal: Pain level will decrease  Description: Pain level will decrease  Outcome: Ongoing  Goal: Control of acute pain  Description: Control of acute pain  Outcome: Ongoing  Goal: Control of chronic pain  Description: Control of chronic pain  Outcome: Ongoing formula in bottle

## 2022-04-08 NOTE — PROGRESS NOTES
Gastroenterology Progress Note      Sixto Lundy is a 46 y.o. male patient. Principal Problem:    Pneumoperitoneum  Active Problems:    Perforated bowel (HCC)    Postoperative abdominal pain with fever    Fecal peritonitis ([address]    SIRS (systemic inflammatory response syndrome) (HCC)    Streptococcal bacteremia    Colon cancer metastasized to liver (HCC)    Low grade fever    Essential hypertension    Class 1 obesity due to excess calories with body mass index (BMI) of 33.0 to 33.9 in adult  Resolved Problems:    * No resolved hospital problems. *      SUBJECTIVE:  Patient is doing well today   No complaints except for the diet     ROS:  No fever, chills  No chest pain, palpitations  No SOB, cough  Gastrointestinal ROS: no abdominal pain, nausea, vomiting     Physical    VITALS:  BP (!) 104/59   Pulse 67   Temp 97.8 °F (36.6 °C) (Oral)   Resp 18   Ht 5' 11\" (1.803 m)   Wt 240 lb (108.9 kg)   SpO2 96%   BMI 33.47 kg/m²   TEMPERATURE:  Current - Temp: 97.8 °F (36.6 °C); Max - Temp  Av °F (36.7 °C)  Min: 97.7 °F (36.5 °C)  Max: 98.3 °F (36.8 °C)    NAD  RRR, Nl s1s2  Lungs CTA Bilaterally, normal effort  Abdomen soft, ND, NT, no HSM, Bowel sounds normal.    Data    Data Review:    Recent Labs     22  0426 22  0835   WBC 14.8* 9.5 6.0   HGB 15.6 14.4 14.3   HCT 46.2 40.8 41.4   MCV 94.4 94.4 94.0    235 248     Recent Labs     22  0426    139   K 3.9 4.0    101   CO2 25 26   BUN 10 8   CREATININE 1.1 0.9     Recent Labs     22   AST 21   ALT 16   BILITOT 0.5   ALKPHOS 54     No results for input(s): LIPASE, AMYLASE in the last 72 hours. No results for input(s): PROTIME, INR in the last 72 hours. No results for input(s): PTT in the last 72 hours.     Radiology Review:        ASSESSMENT   pneumoparitenum  : after a colon   He is stable   Ok to go to a full liquid diet    Bacteremia : gram positive   On treatment     When dc 'ed needs to follow up with Dr Renee Howell

## 2022-04-08 NOTE — CARE COORDINATION
Discharge Planning:     (CM) spoke with Colin Escalante at [facility] ([phone]) who stated that [facility]) will be servicing patient upon discharge for [place] [address] needs. Colin Escalante stated that she has not ran the patient's IV benefits yet, but that she will and update CM Team once completed.       Dilcia PACHECO, ADENIKE[facility], [facility] -   [phone]    Electronically signed by TARA Katz on 4/8/2022 at 3:51 PM

## 2022-04-08 NOTE — PROGRESS NOTES
Infectious Diseases   Progress Note      Admission Date: 4/6/2022  Hospital Day: Hospital Day: 3   Attending: Jerson Jacobs MD  Date of service: 4/8/2022     Chief complaint/ Reason for consult:     · Systemic inflammatory response syndrome on admission with leukocytosis, tachycardia  · Streptococcal bacteremia   · Fecal peritonitis  · Bowel perforation  · Metastatic colon cancer with primary of splenic flexure and history of a prior colonoscopic stent with migration of fistulization to the stomach    Microbiology:        I have reviewed allavailable micro lab data and cultures    · Blood culture (2/2) - collected on 4/6/2022: Streptococcus species        Antibiotics and immunizations:       Current antibiotics: All antibiotics and their doses were reviewed by me    Recent Abx Admin                   piperacillin-tazobactam (ZOSYN) 3,375 mg in dextrose 5 % 50 mL IVPB extended infusion (mini-bag) (mg) 3,375 mg New Bag 04/08/22 1003     3,375 mg New Bag  0207     3,375 mg New Bag 04/07/22 1715    fluconazole (DIFLUCAN) in 0.9 % sodium chloride IVPB 200 mg (mg) 200 mg New Bag 04/07/22 1437                  Immunization History: All immunization history was reviewed by me today. Immunization History   Administered Date(s) Administered    COVID-19, Pfizer Purple top, DILUTE for use, 12+ yrs, 30mcg/0.3mL dose 03/08/2021, 04/05/2021, 10/23/2021       Known drug allergies: All allergies were reviewed and updated    Allergies   Allergen Reactions    Amoxicillin Hives       Social history:     Social History:  All social andepidemiologic history was reviewed and updated by me today as needed. · Tobacco use:   reports that he has never smoked. He has never used smokeless tobacco.  · Alcohol use:   reports current alcohol use. · Currently lives in: [address] [place] [address]  ·  has no history on file for drug use.      COVID VACCINATION AND LAB RESULT RECORDS:     Internal Administration   First Dose COVID-19, Pfizer Purple top, DILUTE for use, 12+ yrs, 30mcg/0.3mL dose  03/08/2021   Second Dose COVID-19, Pfizer Purple top, DILUTE for use, 12+ yrs, 30mcg/0.3mL dose   04/05/2021       Last COVID Lab SARS-CoV-2 RNA, RT PCR (no units)   Date Value   04/06/2022 NOT DETECTED            Assessment:     The patient is a 46 y.o. old male who  has a past medical history of Arthritis, Cancer ([address] and Hypertension. with following problems:    · Systemic inflammatory response syndrome on admission with leukocytosis, tachycardia-covered with Zosyn  · Streptococcal bacteremia -covered with Zosyn  · Fecal peritonitis-covered with Zosyn and fluconazole  · Bowel perforation  · Metastatic colon cancer with primary of splenic flexure and history of a prior colonoscopic stent with migration of fistulization to the stomach  · S/p colostomy with the development of a large peristomal hernia  · Low-grade fever-resolved  · Essential hypertension-blood pressure okay  · History of amoxicillin allergy  · Obesity Class 1 due to excess calorie intake : Body mass index is 33.47 kg/m². ·       Discussion:      Patient had 2 sets of blood cultures done on admission, which came back positive for Streptococcus. Further speciation is pending. Looks like both sets were drawn on the same date at the same time from the left antecubital fossa. Hence, technically it would be 1 set. However, the speciation indicates a Streptococcus mitis    White cell count is 6000 today. 2D echo has been done today. Pathology report from gastric and colon mass biopsy reviewed. It was negative for malignancy or H. pylori. Plan:     Diagnostic Workup:    · Will order 2 sets of follow-up blood cultures today  · Follow-up on 2D echo  · Continue to follow  fever curve, WBC count and blood cultures. · Continue to monitor blood counts, liver and renal function.     Antimicrobials:    · Will continue IV Zosyn 3.375 g every 8 hour  · Continue IV fluconazole 200 mg every 24 hour  · The patient has a listed amoxicillin allergy, however, seems to be tolerating IV Zosyn just fine  · Will try to de-escalate antibiotics in coming days  · Hold off on PICC line until Monday  · We will follow up on the culture results and clinical progress and will make further recommendations accordingly. · Continue close vitals monitoring. · Maintain good glycemic control. · Fall precautions. Aspiration precautions. · Continue to watch for new fever or diarrhea. · DVT prophylaxis. · Discussed all above with patient and RN. Drug Monitoring:    · Continue monitoring for antibiotic toxicity as follows: CBC, CMP   · Continue to watch for following: new or worsening fever, new hypotension, hives, lip swelling and redness or purulence at vascular access sites. I/v access Management:    · Continue to monitor i.v access sites for erythema, induration, discharge or tenderness. · As always, continue efforts to minimize tubes/lines/drains as clinically appropriate to reduce chances of line associated infections. Patient education and counseling:        · The patient was educated in detail about the side-effects of various antibiotics and things to watch for like new rashes, lip swelling, severe reaction, worsening diarrhea, break through fever etc.  · Discussed patient's condition and what to expect. All of the patient's questions were addressed in a satisfactory manner and patient verbalized understanding all instructions. Level of complexity of visit: High     Weight loss counseling:    Extensive weight loss counseling was done. It is important to set a realistic weight loss goal. First goal should be to avoid gaining more weight and staying at current weight (or within 5 percent).  People at high risk of developing diabetes who are able to lose 5 percent of their body weight and maintain this weight will reduce their risk of developing diabetes by about 50 percent and reduce their blood pressure. Losing more than 15 percent of  body weight and staying at this weight is an extremely good result, even if you never reach your \"dream\" or \"ideal\" weight. Lifestyle changes including changing eating habits, substituting excess carbohydrates with proteins, stress reduction, using self-help programs like Weight Watchers®, Overeaters Anonymous®, and Take Off Pounds Sensibly (TOPS)© , following DASH diet and increasing exercise or walking briskly daily for half hour to and hour 5-7 days a week was suggested among other measures. Information was given about various weight loss education programs and their websites like www.cdc.gov/healthyweight, www.choosemyplate.gov and www.health.gov/dietaryguidelines/      TIME SPENT TODAY:     - Spent over  36 minutes on visit (including interval history, physical exam, review of data including labs, cultures, imaging, development and implementation of treatment plan and coordination of complex care). More than 50 percent of this includes face-to-face time spent with the patient for counseling and coordination of care. Thank you for involving me in the care of your patient. I will continue to follow. If you have anyadditional questions, please do not hesitate to contact me. Subjective: Interval history: Interval history was obtained from chart review and patient/ RN. The patient is afebrile. She is tolerating antibiotics okay. No diarrhea     REVIEW OF SYSTEMS:      Review of Systems   Constitutional: Positive for fatigue. Negative for chills, diaphoresis and fever. HENT: Negative for ear discharge, ear pain, rhinorrhea, sore throat and trouble swallowing. Eyes: Negative for discharge and redness. Respiratory: Negative for cough, shortness of breath and wheezing. Cardiovascular: Negative for chest pain and leg swelling. Gastrointestinal: Negative for abdominal pain, constipation, diarrhea and nausea. Endocrine: Negative for polyuria. Genitourinary: Negative for dysuria, flank pain, frequency, hematuria and urgency. Musculoskeletal: Negative for back pain and myalgias. Skin: Negative for rash. Neurological: Negative for dizziness, seizures and headaches. Hematological: Does not bruise/bleed easily. Psychiatric/Behavioral: Negative for hallucinations and suicidal ideas. All other systems reviewed and are negative. Past Medical History: All past medical history reviewed today. Past Medical History:   Diagnosis Date    Arthritis     rheumatoid    Cancer ([facility] [address]     colon    Hypertension        Past Surgical History: All past surgical history was reviewed today. Past Surgical History:   Procedure Laterality Date    ABDOMEN SURGERY      colectomy with ostomy    COLONOSCOPY      COLONOSCOPY N/A 4/6/2022    COLONOSCOPY WITH BIOPSY performed by Levi Wolf MD at [address] [place] [address] GASTROINTESTINAL ENDOSCOPY N/A 4/6/2022    EGD BIOPSY performed by Levi Wolf MD at [place] [id number]       Family History: All family history was reviewed today. History reviewed. No pertinent family history. Objective:       PHYSICAL EXAM:      Vitals:   Vitals:    04/08/22 0511 04/08/22 0810 04/08/22 0849 04/08/22 1130   BP: 109/73 (!) 104/59  124/82   Pulse: 57 67 67 69   Resp: 18 18  18   Temp: 97.7 °F (36.5 °C) 97.8 °F (36.6 °C)  98.7 °F (37.1 °C)   TempSrc: Oral Oral  Oral   SpO2: 97% 96%  97%   Weight:       Height:           Physical Exam  Vitals and nursing note reviewed. Constitutional:       Appearance: Normal appearance. He is well-developed. HENT:      Head: Normocephalic and atraumatic. Right Ear: External ear normal.      Left Ear: External ear normal.      Nose: Nose normal. No congestion or rhinorrhea. Mouth/Throat:      Mouth: Mucous membranes are moist.      Pharynx: No oropharyngeal exudate or posterior oropharyngeal erythema.    Eyes:      General: No scleral icterus. Right eye: No discharge. Left eye: No discharge. Conjunctiva/sclera: Conjunctivae normal.      Pupils: Pupils are equal, round, and reactive to light. Cardiovascular:      Rate and Rhythm: Normal rate and regular rhythm. Pulses: Normal pulses. Heart sounds: No murmur heard. No friction rub. Pulmonary:      Effort: Pulmonary effort is normal. No respiratory distress. Breath sounds: Normal breath sounds. No stridor. No wheezing, rhonchi or rales. Abdominal:      General: Bowel sounds are normal.      Palpations: Abdomen is soft. Tenderness: There is no abdominal tenderness. There is no right CVA tenderness, left CVA tenderness, guarding or rebound. Musculoskeletal:         General: No swelling or tenderness. Normal range of motion. Cervical back: Normal range of motion and neck supple. No rigidity. No muscular tenderness. Lymphadenopathy:      Cervical: No cervical adenopathy. Skin:     General: Skin is warm and dry. Coloration: Skin is not jaundiced. Findings: No erythema or rash. Neurological:      General: No focal deficit present. Mental Status: He is alert and oriented to person, place, and time. Mental status is at baseline. Motor: No abnormal muscle tone. Psychiatric:         Mood and Affect: Mood normal.         Behavior: Behavior normal.         Thought Content: Thought content normal.            Lines and drains: All vascular access sites are healthy with no local erythema, discharge or tenderness. Intake and output:    I/O last 3 completed shifts:  In: -   Out: 920 [Urine:920]    Lab Data:   All available labs and old records have been reviewed by me.     CBC:  Recent Labs     04/06/22  2020 04/07/22  0426 04/08/22  0835   WBC 14.8* 9.5 6.0   RBC 4.89 4.32 4.40   HGB 15.6 14.4 14.3   HCT 46.2 40.8 41.4    235 248   MCV 94.4 94.4 94.0   MCH 32.0 33.2 32.5   MCHC 33.9 35.2 34.6   RDW 13.7 13.8 13.9 BMP:  Recent Labs     04/06/22 2020 04/07/22  0426    139   K 3.9 4.0    101   CO2 25 26   BUN 10 8   CREATININE 1.1 0.9   CALCIUM 10.1 9.6   GLUCOSE 110* 105*        Hepatic Function Panel:   Lab Results   Component Value Date    ALKPHOS 54 04/06/2022    ALT 16 04/06/2022    AST 21 04/06/2022    PROT 7.5 04/06/2022    BILITOT 0.5 04/06/2022    LABALBU 4.7 04/06/2022       CPK: No results found for: CKTOTAL  ESR: No results found for: SEDRATE  CRP: No results found for: CRP        Imaging: All pertinent images and reports for the current visit were reviewed by me during this visit. I reviewed the chest x-ray/CT scan/MRI images and independently interpreted the findings and results today. CT ABDOMEN PELVIS W IV CONTRAST Additional Contrast? None   Final Result   Moderate intraperitoneal and retroperitoneal free air seen throughout   extending into the pararenal space and into the pelvis which is suspicious   for a bowel perforation. Recommend surgical follow-up. Surgical stent seen along the posterior wall of the distal body of the   stomach extending into the splenic flexure of the colon with some minimal   stranding around the colon in this area extending inferiorly and numerous   small punctate air collections throughout the area extending along the   retroperitoneum and pararenal space. This could be the etiology of the   patient's perforation. There is a 3 cm hyperdensity in the stent which could   represent enhancing tumor or clot. Recommend correlating with previous   endoscopic findings. Probable calcified metastasis throughout the liver. Suggest PET scan   correlation. Mild splenomegaly. Large 4-5 cm abdominal wall hernia around the light right lower pelvis with   extensive bowel loops throughout the hernia which appear normal caliber with   no obvious bowel obstruction or wall thickening seen.       Mild prostatic enlargement and diffuse mild bladder wall thickening which may   be due to poor distention or an element of bladder outlet obstruction. The findings were discussed with Dr. Eric Beckett at 10 p.m. Medications: All current and past medications were reviewed.  piperacillin-tazobactam  3,375 mg IntraVENous Q8H    losartan  50 mg Oral Daily    sodium chloride flush  5-40 mL IntraVENous 2 times per day    enoxaparin  40 mg SubCUTAneous Daily    fluconazole  200 mg IntraVENous Q24H        sodium chloride 100 mL/hr at 04/07/22 0041    sodium chloride 25 mL (04/08/22 0953)       morphine, sodium chloride flush, sodium chloride, ondansetron **OR** ondansetron, polyethylene glycol, acetaminophen **OR** acetaminophen      Problem list:       Patient Active Problem List   Diagnosis Code    Pneumoperitoneum K66.8    Perforated bowel ([facility] K63.1    Postoperative abdominal pain with fever R10.9, G89.18, R50.82    Fecal peritonitis ([facility] K65.8    SIRS (systemic inflammatory response syndrome) ([facility] R65.10    Streptococcal bacteremia R78.81, B95.5    Colon cancer metastasized to liver ([facility] C18.9, C78.7    Low grade fever R50.9    Essential hypertension I10    Class 1 obesity due to excess calories with body mass index (BMI) of 33.0 to 33.9 in adult E66.09, Z68.33    Weight loss counseling, encounter for Z71.3       Please note that this chart was generated using Dragon dictation software. Although every effort was made to ensure the accuracy of this automated transcription, some errors in transcription may have occurred inadvertently. If you may need any clarification, please do not hesitate to contact me through [employer] or at the phone number provided below with my electronic signature. Any pictures or media included in this note were obtained after taking informed verbal consent from the patient and with their approval to include those in the patient's medical record.       Hina Whittaker MD, MPH, [address]  4/8/2022, 12:27 PM  [facility] Infectious Disease   [address] [place] [address], [address] [facility], [address] [place] [address]  Office: [phone]  Fax: [phone]  Clinic days:  Tuesday & Thursday

## 2022-04-08 NOTE — PROGRESS NOTES
VSS. Call light within reach. Assessment complete at this time. No signs of distress or additional needs at this time. Scheduled medications given.  Pt to Echo this AM.

## 2022-04-08 NOTE — PROGRESS NOTES
[facility]        Progress Note    Patient Name: Rowan Mayorga  MRN: [id number]  [place]: 1970  Date of Evaluation: 2022    Chief Complaint: Fevers, chills    Subjective:  No acute events overnight  Denies abdominal pain  No nausea or vomiting, tolerating full liquid diet (advanced earlier this morning)  Enteric output per colostomy; denies bloating  Resting in bed at this time      Vital Signs:  Patient Vitals for the past 24 hrs:   BP Temp Temp src Pulse Resp SpO2   22 1130 124/82 98.7 °F (37.1 °C) Oral 69 18 97 %   22 0849 -- -- -- 67 -- --   22 0810 (!) 104/59 97.8 °F (36.6 °C) Oral 67 18 96 %   22 0511 109/73 97.7 °F (36.5 °C) Oral 57 18 97 %   22 2145 125/86 98.3 °F (36.8 °C) Oral 73 18 98 %   22 1604 103/77 98.3 °F (36.8 °C) Oral 73 18 97 %      TEMPERATURE HISTORY 24H: Temp (24hrs), Av.2 °F (36.8 °C), Min:97.7 °F (36.5 °C), Max:98.7 °F (37.1 °C)    BLOOD PRESSURE HISTORY: Systolic (56VYE), OYT:475 , Min:103 , WFC:513    Diastolic (31JJK), KZR:87, Min:59, Max:86      Intake/Output:  I/O last 3 completed shifts:  In: -   Out: 920 [Urine:920]  No intake/output data recorded.   Drain/tube Output:       Physical Exam:  General: awake, alert, oriented to person, place, time  Cardiovascular:  regular rate and rhythm and no murmur noted  Lungs: clear to auscultation  Abdomen: soft, nontender, nondistended, bowel sounds normal, stoma red/viable with peristoma hernia and mild prolapse, enteric output in pouch     Labs:  CBC:    Recent Labs     22  2020 22  0426 22  0835   WBC 14.8* 9.5 6.0   HGB 15.6 14.4 14.3   HCT 46.2 40.8 41.4    235 248     BMP:    Recent Labs     22  0426    139   K 3.9 4.0    101   CO2 25 26   BUN 10 8   CREATININE 1.1 0.9   GLUCOSE 110* 105*     Hepatic:    Recent Labs     22   AST 21   ALT 16   BILITOT 0.5   ALKPHOS 54     Amylase: Lab Results   Component Value Date    AMYLASE 44 09/21/2016     Lipase:    Lab Results   Component Value Date    LIPASE 14.0 09/21/2016      Mag:  No results found for: MG  Phos:   No results found for: PHOS   Coags: No results found for: PROTIME, INR, APTT    Cultures:  Anaerobic culture  No results found for: LABANAE  Fungus stain  No results found for requested labs within last 30 days. Gram stain  No results found for requested labs within last 30 days. Organism  Lab Results   Component Value Date/Time    ORG Streptococcus species DNA Detected (A) 04/06/2022 08:20 PM     Surgical culture  No results found for: CXSURG  Blood culture 1  Results in Past 30 Days  Result Component Current Result Ref Range Previous Result Ref Range   Blood Culture, Routine Gram stain Aerobic bottle:  Gram positive cocci in chains and/or pairs  resembling Streptococcus  Information to follow  Gram stain Anaerobic bottle:  Gram positive cocci in chains and/or pairs  resembling Streptococcus  Information to follow   (A) (4/6/2022)  Not in Time Range      Blood culture 2  Results in Past 30 Days  Result Component Current Result Ref Range Previous Result Ref Range   Culture, Blood 2 Gram stain Aerobic bottle:  Gram positive cocci in chains and/or pairs  resembling Streptococcus  Information to follow  Gram stain Anaerobic bottle:  Gram positive cocci in chains and/or pairs  resembling Streptococcus  Information to follow   (A) (4/6/2022)  Not in Time Range     See additional report for complete BCID panel. (4/6/2022)        Fecal occult  No results found for requested labs within last 30 days. GI bacterial pathogens by PCR  No results found for requested labs within last 30 days. C. difficile  No results found for requested labs within last 30 days.      Urine culture  No results found for: LABURIN    Pathology:  No relevant pathology     Imaging:  I have personally reviewed the following films:    ECHO Complete 2D W Doppler W Color    Result Date: 4/8/2022  Transthoracic Echocardiography Report (TTE)  Demographics   Patient Name      Bee Logan   Date of Study     04/08/2022          Gender              Male   Patient Number    [id number]          Date of Birth       1970   Visit Number      [id number]           Age                 46 year(s)   Accession Number  [id number]          Room Number         1503   Corporate ID      [id number]            Telma Knight [facility]   Ordering          Lisa Rogers MD  Interpreting        Fide Pagan MD,  Physician                             Physician           [facility], [facility]  Procedure Type of Study   TTE procedure:ECHOCARDIOGRAM COMPLETE 2D W DOPPLER W COLOR. Procedure Date Date: 04/08/2022 Start: 09:36 AM Study Location: [facility] - Echo Lab Technical Quality: Adequate visualization Additional Indications:R/o endocarditis. Patient Status: Routine Height: 71 inches Weight: 240.01 pounds BSA: 2.28 m2 BMI: 33.47 kg/m2 HR: 64 bpm BP: 109/73 mmHg  Conclusions   Summary  Left ventricular systolic function is normal with ejection fraction  estimated at 50 %. Normal left ventricular wall thickness. The aortic root is mildly dilated. Signature   ------------------------------------------------------------------  Electronically signed by Fide Pagan MD, [facility], [address]  (Interpreting physician) on 04/08/2022 at 12:54 PM  ------------------------------------------------------------------   Findings   Left Ventricle  Left ventricular systolic function is normal with ejection fraction  estimated at 50 %. Normal left ventricular wall thickness. Left ventricle size is normal.   Mitral Valve  Mitral valve is structurally normal.  Trivial mitral regurgitation. Left Atrium  The left atrium is normal in size. Aortic Valve  The aortic valve is normal in structure and function.   No evidence of aortic valve regurgitation. Aorta  The aortic root is mildly dilated. Right Ventricle  The right ventricular size is normal.   Tricuspid Valve  Tricuspid valve is structurally normal.  Trivial tricuspid regurgitation. Right Atrium  The right atrium is mildly dilated. Pulmonic Valve  The pulmonic valve is normal in structure and function. No evidence of pulmonic valve regurgitation. Pericardial Effusion  No pericardial effusion noted. Pleural Effusion  No pleural effusion noted. Miscellaneous  IVC is normal in size (< 2.1 cm) and collapses > 50% with respiration  consistent with normal RA pressure (3 mmHg). M-Mode/2D Measurements (cm)   LV Diastolic Dimension: 3.66 cm LV Systolic Dimension: 2.8 cm  LV Septum Diastolic: 4.60 cm  LV PW Diastolic: 1 cm           AO Root Dimension: 3.6 cm                                   LA Area: 20.3 cm2  LVOT: 2.1 cm                    LA volume/Index: 57.57 ml /25 ml/m2  Doppler Measurements   AV Peak Velocity: 128 cm/s     MV Peak E-Wave: 76.3 cm/s  AV Peak Gradient: 6.55 mmHg    MV Peak A-Wave: 68.1 cm/s  AV Mean Gradient: 4 mmHg       MV E/A Ratio: 1.12  LVOT Peak Velocity: 104 cm/s  AV Area (Continuity):2.07 cm2   TR Velocity:170 cm/s  TR Gradient:11.56 mmHg  Estimated RAP:3 mmHg  Estimated RVSP: 15 mmHg  E' Septal Velocity: 9.25 cm/s  E' Lateral Velocity: 18.2 cm/s  E/E' ratio: 6.2   Aortic Valve   Peak Velocity: 128 cm/s     Mean Velocity: 87.8 cm/s  Peak Gradient: 6.55 mmHg    Mean Gradient: 4 mmHg  Area (continuity): 2.07 cm2  AV VTI: 34.9 cm  Aorta   Aortic Root: 3.6 cm     Aortic Arch: 2.9 cm  Ascending Aorta: 3.4 cm  LVOT Diameter: 2.1 cm      CT ABDOMEN PELVIS W IV CONTRAST Additional Contrast? None    Result Date: 4/6/2022  EXAMINATION: CT OF THE ABDOMEN AND PELVIS WITH CONTRAST 4/6/2022 9:14 pm TECHNIQUE: CT of the abdomen and pelvis was performed with the administration of intravenous contrast. Multiplanar reformatted images are provided for review.  Dose modulation, iterative reconstruction, and/or weight based adjustment of the mA/kV was utilized to reduce the radiation dose to as low as reasonably achievable. COMPARISON: None. HISTORY: ORDERING SYSTEM PROVIDED HISTORY: post op fever TECHNOLOGIST PROVIDED HISTORY: Reason for exam:->post op fever Additional Contrast?->None Decision Support Exception - unselect if not a suspected or confirmed emergency medical condition->Emergency Medical Condition (MA) Reason for Exam: Fall (Patient states that he was walking to the basement and tripped and fell down approx 14 steps this morning. He endorses loss of consciousness,knot on his head and states that he has thrown up several times throughout the day and is experiencing right sided flank pain.) FINDINGS: Lower Chest: The lung bases are clear. Organs: The liver is borderline enlarged with fatty replacement throughout. The spleen measures 14 cm in length and is normal density. There are a couple of partially calcified masses along the right lobe of the liver superiorly measuring up to 2.4 cm with smaller masses inferiorly and a few calcified masses in the left lobe with the largest laterally measuring 2 cm. The gallbladder, pancreas, and bile ducts are normal.  The adrenals are normal.  The kidneys are normal size and function normally with no hydronephrosis or renal stones. The ureters are normal caliber. GI/bowel: There is a linear radiopaque stent extending through the posterior aspect of the distal body of the stomach and appears to extend into the splenic flexure of the colon. There are moderate punctate collections of free air scattered in the mesentery along the left upper quadrant. There is some minimal stranding in the mesentery around the splenic flexure extending inferiorly along the proximal left colon with no focal fluid or air collection. There is a 3 cm rounded area of mild enhancement or hyperdensity in the proximal aspect of the stent.   There is mild punctate free air scattered along the pararenal space and upper abdomen which extends into the mesentery and into the lower abdomen and pelvis. The small bowel is normal caliber. Pelvis: The bladder is unremarkable. No adenopathy or ascites is seen in the pelvis. The prostate gland is mildly enlarged and partially calcified. The bladder is unremarkable. Peritoneum/Retroperitoneum:   There is a 4-5 cm fascial defect along the lower abdominal wall with peritoneal fat and bowel loops extending through the defect into the hernia which appear normal caliber with no bowel wall thickening or edema. The appendix is not well visualized. The aorta is unremarkable with no aneurysm or dissection and no retroperitoneal mass or adenopathy is seen. Bones/Soft Tissues: The bones are intact. No aggressive osseous lesion is seen. Moderate intraperitoneal and retroperitoneal free air seen throughout extending into the pararenal space and into the pelvis which is suspicious for a bowel perforation. Recommend surgical follow-up. Surgical stent seen along the posterior wall of the distal body of the stomach extending into the splenic flexure of the colon with some minimal stranding around the colon in this area extending inferiorly and numerous small punctate air collections throughout the area extending along the retroperitoneum and pararenal space. This could be the etiology of the patient's perforation. There is a 3 cm hyperdensity in the stent which could represent enhancing tumor or clot. Recommend correlating with previous endoscopic findings. Probable calcified metastasis throughout the liver. Suggest PET scan correlation. Mild splenomegaly. Large 4-5 cm abdominal wall hernia around the light right lower pelvis with extensive bowel loops throughout the hernia which appear normal caliber with no obvious bowel obstruction or wall thickening seen.  Mild prostatic enlargement and diffuse mild bladder wall thickening which may be due to poor distention or an element of bladder outlet obstruction. The findings were discussed with Dr. Mike Dejesus at 10 p.m. Scheduled Meds:   piperacillin-tazobactam  3,375 mg IntraVENous Q8H    losartan  50 mg Oral Daily    sodium chloride flush  5-40 mL IntraVENous 2 times per day    enoxaparin  40 mg SubCUTAneous Daily    fluconazole  200 mg IntraVENous Q24H     Continuous Infusions:   sodium chloride 100 mL/hr at 04/07/22 0041    sodium chloride 25 mL (04/08/22 0953)     PRN Meds:.morphine, sodium chloride flush, sodium chloride, ondansetron **OR** ondansetron, polyethylene glycol, acetaminophen **OR** acetaminophen      Assessment:  46 y.o. male admitted with   1. Perforated bowel ([facility] [address]    2. Postoperative abdominal pain with fever        Pneumoperitoneum following colonoscopy  Metastatic colon cancer with primary lesion at splenic flexure causing obstruction   Prior colonoscopic stent, with migration and fistulization to stomach  Loop diverting colostomy in place with parastomal hernia, mild prolapse  Strep bacteremia  Hypertension  Rheumatoid arthritis      Plan:  1. Abdominal exam remains benign, afebrile over last 24 hours, labs stable; no further imaging or surgical intervention planned at this time--will ultimately need left hemicolectomy, partial gastrectomy, repair of peristomal hernia, and either new colonic anastomosis or new site for stoma--follows with Dr. Gian Morton at [facility]  2. Advanced to full diet this morning, tolerating well; monitor colostomy output--appropriate enteric output  3. IV hydration until PO intake is adequate; monitor and correct electrolytes  4. Antibiotics per ID  5. Activity as tolerated  6. Management of medical comorbid etiologies per primary team and consulting services  7.  Disposition: Anticipate discharge home in the next 24-48 hours; follow up with Dr. Gian Morton at discharge    EDUCATION:  Educated patient on plan of care and disease process--all questions answered. Plans discussed with patient and nursing. Reviewed and discussed with Dr. Duyen Hernandez.       Signed:  KAREEN Barone - CNP  4/8/2022 1:15 PM     Surg Staff:     Pt doing fine clinically  No fever, no pain on exam  Fulls po  Decrase IVF  FU cx data with ID  Continue nonoperative management    Eva Gallego MD

## 2022-04-08 NOTE — PROGRESS NOTES
Hospitalist Progress Note      PCP: Lamar Saldivar MD    Date of Admission: 4/6/2022    Chief Complaint: Fever    Hospital Course: Patient with a history of colon cancer status post colostomy came in with fevers that started soon after colonoscopy at [facility] the day before. Found to have pneumoperitoneum. Surgery and GI consulted. Conservative management advised. Tolerating full liquids okay. Found to have strep bacteremia. ID consulted. Echocardiogram pending. Subjective: Tolerating full liquids okay. Had exam this morning. Wife at bedside. Denies any complaints. Medications:  Reviewed    Infusion Medications    sodium chloride 100 mL/hr at 04/07/22 0041    sodium chloride 25 mL (04/08/22 0953)     Scheduled Medications    piperacillin-tazobactam  3,375 mg IntraVENous Q8H    losartan  50 mg Oral Daily    sodium chloride flush  5-40 mL IntraVENous 2 times per day    enoxaparin  40 mg SubCUTAneous Daily    fluconazole  200 mg IntraVENous Q24H     PRN Meds: morphine, sodium chloride flush, sodium chloride, ondansetron **OR** ondansetron, polyethylene glycol, acetaminophen **OR** acetaminophen    No intake or output data in the 24 hours ending 04/08/22 1430    Physical Exam Performed:    /82   Pulse 69   Temp 98.7 °F (37.1 °C) (Oral)   Resp 18   Ht 5' 11\" (1.803 m)   Wt 240 lb (108.9 kg)   SpO2 97%   BMI 33.47 kg/m²     General appearance: No apparent distress, appears stated age and cooperative. HEENT: Pupils equal, round, and reactive to light. Conjunctivae/corneas clear. Neck: Supple, with full range of motion. No jugular venous distention. Trachea midline. Respiratory:  Normal respiratory effort. Clear to auscultation, bilaterally without Rales/Wheezes/Rhonchi. Cardiovascular: Regular rate and rhythm with normal S1/S2 without murmurs, rubs or gallops. Abdomen: Soft, non-tender, colostomy bag in the center. With normal bowel sounds.   Musculoskeletal: No clubbing, cyanosis or edema bilaterally. Full range of motion without deformity. Skin: Skin color, texture, turgor normal.  No rashes or lesions. Neurologic:  Neurovascularly intact without any focal sensory/motor deficits. Cranial nerves: II-XII intact, grossly non-focal.  Psychiatric: Alert and oriented, thought content appropriate, normal insight  Capillary Refill: Brisk,3 seconds, normal   Peripheral Pulses: +2 palpable, equal bilaterally       Labs:   Recent Labs     04/06/22 2020 04/07/22 0426 04/08/22  0835   WBC 14.8* 9.5 6.0   HGB 15.6 14.4 14.3   HCT 46.2 40.8 41.4    235 248     Recent Labs     04/06/22 2020 04/07/22 0426    139   K 3.9 4.0    101   CO2 25 26   BUN 10 8   CREATININE 1.1 0.9   CALCIUM 10.1 9.6     Recent Labs     04/06/22 2020   AST 21   ALT 16   BILITOT 0.5   ALKPHOS 54     No results for input(s): INR in the last 72 hours. No results for input(s): Shoshana Basques in the last 72 hours. Urinalysis:    No results found for: Valentina Ogles, [address], BACTERIA, RBCUA, BLOODU, [address], [place] [place] [address]    Radiology:  CT ABDOMEN PELVIS W IV CONTRAST Additional Contrast? None   Final Result   Moderate intraperitoneal and retroperitoneal free air seen throughout   extending into the pararenal space and into the pelvis which is suspicious   for a bowel perforation. Recommend surgical follow-up. Surgical stent seen along the posterior wall of the distal body of the   stomach extending into the splenic flexure of the colon with some minimal   stranding around the colon in this area extending inferiorly and numerous   small punctate air collections throughout the area extending along the   retroperitoneum and pararenal space. This could be the etiology of the   patient's perforation. There is a 3 cm hyperdensity in the stent which could   represent enhancing tumor or clot. Recommend correlating with previous   endoscopic findings.       Probable calcified metastasis throughout the liver. Suggest PET scan   correlation. Mild splenomegaly. Large 4-5 cm abdominal wall hernia around the light right lower pelvis with   extensive bowel loops throughout the hernia which appear normal caliber with   no obvious bowel obstruction or wall thickening seen. Mild prostatic enlargement and diffuse mild bladder wall thickening which may   be due to poor distention or an element of bladder outlet obstruction. The findings were discussed with Dr. Claudia Torres at 10 p.m. Assessment/Plan:    Active Hospital Problems    Diagnosis     Weight loss counseling, encounter for [Z71.3]     [facility] [K63.1]     Postoperative abdominal pain with fever [R10.9, G89.18, R50.82]     Fecal peritonitis (HCC) [K65.8]     SIRS (systemic inflammatory response syndrome) (HCC) [R65.10]     Streptococcal bacteremia [R78.81, B95.5]     Colon cancer metastasized to liver ([facility] [C18.9, C78.7]     Low grade fever [R50.9]     Essential hypertension [I10]     Class 1 obesity due to excess calories with body mass index (BMI) of 33.0 to 33.9 in adult [E66.09, Z68.33]     Pneumoperitoneum [K66.8]      Pneumoperitoneum: History of recent colonoscopy. CT abdomen noted with migration of the colonic stent into the stomach, possible etiology. General surgery consulted. Tolerating full liquids. Continue gentle hydration. No interventions planned at this time. Strep bacteremia: 2 x 2 cultures positive. ID on board. On IV Zosyn and Diflucan. Leukocytosis resolved. Echocardiogram pending. Speciation and susceptibility pending. History of colon cancer status post colostomy, radiation, chemotherapy    Hypertension: Continue Cozaar. Holding hydrochlorothiazide. History of rheumatoid arthritis    DVT Prophylaxis: Lovenox  Diet: ADULT DIET;  Full Liquid  Code Status: Full Code    PT/OT Eval Status: Not needed    Dispo -inpatient 1 more day at least.    Lesa Chen MD

## 2022-04-09 LAB
ANION GAP SERPL CALCULATED.3IONS-SCNC: 12 MMOL/L (ref 3–16)
BUN BLDV-MCNC: 5 MG/DL (ref 7–20)
CALCIUM SERPL-MCNC: 9.4 MG/DL (ref 8.3–10.6)
CHLORIDE BLD-SCNC: 104 MMOL/L (ref 99–110)
CO2: 24 MMOL/L (ref 21–32)
CREAT SERPL-MCNC: 1 MG/DL (ref 0.9–1.3)
GFR AFRICAN AMERICAN: >60
GFR NON-AFRICAN AMERICAN: >60
GLUCOSE BLD-MCNC: 101 MG/DL (ref 70–99)
HCT VFR BLD CALC: 41.1 % (ref 40.5–52.5)
HEMOGLOBIN: 13.9 G/DL (ref 13.5–17.5)
MCH RBC QN AUTO: 32.1 PG (ref 26–34)
MCHC RBC AUTO-ENTMCNC: 33.7 G/DL (ref 31–36)
MCV RBC AUTO: 95.2 FL (ref 80–100)
PDW BLD-RTO: 13.7 % (ref 12.4–15.4)
PLATELET # BLD: 251 K/UL (ref 135–450)
PMV BLD AUTO: 7.5 FL (ref 5–10.5)
POTASSIUM REFLEX MAGNESIUM: 4.3 MMOL/L (ref 3.5–5.1)
RBC # BLD: 4.32 M/UL (ref 4.2–5.9)
SODIUM BLD-SCNC: 140 MMOL/L (ref 136–145)
WBC # BLD: 5.7 K/UL (ref 4–11)

## 2022-04-09 PROCEDURE — 99232 SBSQ HOSP IP/OBS MODERATE 35: CPT | Performed by: SURGERY

## 2022-04-09 PROCEDURE — 80048 BASIC METABOLIC PNL TOTAL CA: CPT

## 2022-04-09 PROCEDURE — 2580000003 HC RX 258: Performed by: FAMILY MEDICINE

## 2022-04-09 PROCEDURE — 6370000000 HC RX 637 (ALT 250 FOR IP): Performed by: FAMILY MEDICINE

## 2022-04-09 PROCEDURE — 1200000000 HC SEMI PRIVATE

## 2022-04-09 PROCEDURE — 85027 COMPLETE CBC AUTOMATED: CPT

## 2022-04-09 PROCEDURE — 36415 COLL VENOUS BLD VENIPUNCTURE: CPT

## 2022-04-09 PROCEDURE — 6360000002 HC RX W HCPCS: Performed by: INTERNAL MEDICINE

## 2022-04-09 PROCEDURE — 6360000002 HC RX W HCPCS: Performed by: FAMILY MEDICINE

## 2022-04-09 PROCEDURE — 94760 N-INVAS EAR/PLS OXIMETRY 1: CPT

## 2022-04-09 RX ORDER — PANTOPRAZOLE SODIUM 40 MG/1
40 TABLET, DELAYED RELEASE ORAL
Status: DISCONTINUED | OUTPATIENT
Start: 2022-04-10 | End: 2022-04-10 | Stop reason: HOSPADM

## 2022-04-09 RX ADMIN — ENOXAPARIN SODIUM 40 MG: 40 INJECTION SUBCUTANEOUS at 10:29

## 2022-04-09 RX ADMIN — SODIUM CHLORIDE, PRESERVATIVE FREE 10 ML: 5 INJECTION INTRAVENOUS at 10:30

## 2022-04-09 RX ADMIN — LOSARTAN POTASSIUM 50 MG: 25 TABLET, FILM COATED ORAL at 10:29

## 2022-04-09 RX ADMIN — FLUCONAZOLE 200 MG: 2 INJECTION, SOLUTION INTRAVENOUS at 15:34

## 2022-04-09 RX ADMIN — PIPERACILLIN AND TAZOBACTAM 3375 MG: 3; .375 INJECTION, POWDER, LYOPHILIZED, FOR SOLUTION INTRAVENOUS at 02:33

## 2022-04-09 RX ADMIN — PIPERACILLIN AND TAZOBACTAM 3375 MG: 3; .375 INJECTION, POWDER, LYOPHILIZED, FOR SOLUTION INTRAVENOUS at 19:06

## 2022-04-09 RX ADMIN — PIPERACILLIN AND TAZOBACTAM 3375 MG: 3; .375 INJECTION, POWDER, LYOPHILIZED, FOR SOLUTION INTRAVENOUS at 10:35

## 2022-04-09 ASSESSMENT — PAIN SCALES - GENERAL
PAINLEVEL_OUTOF10: 0
PAINLEVEL_OUTOF10: 0

## 2022-04-09 NOTE — PROGRESS NOTES
[facility]        Progress Note    Patient Name: Ruby Gonzalez  MRN: [id number]  [place]: 1970  Date of Evaluation: 2022    Chief Complaint: Fevers, chills    Subjective:  No acute events overnight, in good spirits  Denies abdominal pain  No nausea or vomiting, tolerating full liquid diet  Normal enteric output per colostomy; denies bloating  Resting in bed at this time      Vital Signs:  Patient Vitals for the past 24 hrs:   BP Temp Temp src Pulse Resp SpO2   22 1210 -- -- -- -- 16 98 %   22 0900 126/72 98.4 °F (36.9 °C) Oral 74 16 98 %   22 0449 110/71 98.1 °F (36.7 °C) Oral 62 18 99 %   22 2100 121/89 98.4 °F (36.9 °C) Oral 71 18 98 %   22 1805 -- -- -- -- -- 97 %      TEMPERATURE HISTORY 24H: Temp (24hrs), Av.3 °F (36.8 °C), Min:98.1 °F (36.7 °C), Max:98.4 °F (36.9 °C)    BLOOD PRESSURE HISTORY: Systolic (11GGQ), KOV:186 , Min:104 , RMH:394    Diastolic (14UBT), IRT:88, Min:59, Max:89      Intake/Output:  No intake/output data recorded.   I/O this shift:  In: 360 [P.O.:360]  Out: -   Drain/tube Output:       Physical Exam:  General: awake, alert, oriented to person, place, time  Cardiovascular:  regular rate and rhythm and no murmur noted  Lungs: clear to auscultation  Abdomen: soft, nontender, nondistended, bowel sounds normal, stoma red/viable with peristoma hernia and mild prolapse, enteric output in pouch     Labs:  CBC:    Recent Labs     22  0835 22  0534   WBC 9.5 6.0 5.7   HGB 14.4 14.3 13.9   HCT 40.8 41.4 41.1    248 251     BMP:    Recent Labs     22/22  0534    139 140   K 3.9 4.0 4.3    101 104   CO2 25 26 24   BUN 10 8 5*   CREATININE 1.1 0.9 1.0   GLUCOSE 110* 105* 101*     Hepatic:    Recent Labs     22   AST 21   ALT 16   BILITOT 0.5   ALKPHOS 54     Amylase:    Lab Results   Component Value Date    AMYLASE 44 2016 Lipase:    Lab Results   Component Value Date    LIPASE 14.0 09/21/2016      Mag:  No results found for: MG  Phos:   No results found for: PHOS   Coags: No results found for: PROTIME, INR, APTT    Cultures:  Anaerobic culture  No results found for: LABANAE  Fungus stain  No results found for requested labs within last 30 days. Gram stain  No results found for requested labs within last 30 days.      Organism  Lab Results   Component Value Date/Time    ORG Streptococcus mitis/oralis (A) 04/06/2022 08:20 PM    ORG Streptococcus salivarius group (A) 04/06/2022 08:20 PM    ORG Streptococcus species DNA Detected (A) 04/06/2022 08:20 PM    ORG Streptococcus mitis/oralis (A) 04/06/2022 08:20 PM    ORG Streptococcus salivarius group (A) 04/06/2022 08:20 PM     Surgical culture  No results found for: CXSURG  Blood culture 1  Results in Past 30 Days  Result Component Current Result Ref Range Previous Result Ref Range   Blood Culture, Routine Gram stain Aerobic bottle:  Gram positive cocci in chains and/or pairs  resembling Streptococcus  Information to follow  Gram stain Anaerobic bottle:  Gram positive cocci in chains and/or pairs  resembling Streptococcus  Information to follow   (A) (4/6/2022)  Not in Time Range     POSITIVE for  Isolated two of two sets  No further workup  Refer to culture [id number] for sensitivity results   (4/6/2022)       POSITIVE for  Isolated two of two sets  No further workup  Refer to culture [id number] for sensitivity results   (4/6/2022)        Blood culture 2  Results in Past 30 Days  Result Component Current Result Ref Range Previous Result Ref Range   Culture, Blood 2 Gram stain Aerobic bottle:  Gram positive cocci in chains and/or pairs  resembling Streptococcus  Information to follow  Gram stain Anaerobic bottle:  Gram positive cocci in chains and/or pairs  resembling Streptococcus  Information to follow   (A) (4/6/2022)  Not in Time Range     See additional report for complete BCID panel. (4/6/2022)       POSITIVE for  Isolated two of two sets  Sensitivity to follow   (4/6/2022)       POSITIVE for  Sensitivity to follow  Isolated two of two sets   (4/6/2022)        Fecal occult  No results found for requested labs within last 30 days. GI bacterial pathogens by PCR  No results found for requested labs within last 30 days. C. difficile  No results found for requested labs within last 30 days. Urine culture  No results found for: LABURIN    Pathology:  No relevant pathology     Imaging:  I have personally reviewed the following films:    ECHO Complete 2D W Doppler W Color    Result Date: 4/8/2022  Transthoracic Echocardiography Report (TTE)  Demographics   Patient Name      [address]    Date of Study     04/08/2022          Gender              Male   Patient Number    [id number]          Date of Birth       1970   Visit Number      [id number]           Age                 46 year(s)   Accession Number  [id number]          Room Number         9543   Corporate ID      [id number]            Sonographer         Hailee Cisneros T   Ordering          Lisa Rogers MD  Interpreting        Fide Pagan MD,  Physician                             Physician           Gabrielle Suárez  Procedure Type of Study   TTE procedure:ECHOCARDIOGRAM COMPLETE 2D W DOPPLER W COLOR. Procedure Date Date: 04/08/2022 Start: 09:36 AM Study Location: [facility] - Echo Lab Technical Quality: Adequate visualization Additional Indications:R/o endocarditis. Patient Status: Routine Height: 71 inches Weight: 240.01 pounds BSA: 2.28 m2 BMI: 33.47 kg/m2 HR: 64 bpm BP: 109/73 mmHg  Conclusions   Summary  Left ventricular systolic function is normal with ejection fraction  estimated at 50 %. Normal left ventricular wall thickness. The aortic root is mildly dilated.    Signature ------------------------------------------------------------------  Electronically signed by Tal Ojeda MD, [facility] [place], [address]  (Interpreting physician) on 04/08/2022 at 12:54 PM  ------------------------------------------------------------------   Findings   Left Ventricle  Left ventricular systolic function is normal with ejection fraction  estimated at 50 %. Normal left ventricular wall thickness. Left ventricle size is normal.   Mitral Valve  Mitral valve is structurally normal.  Trivial mitral regurgitation. Left Atrium  The left atrium is normal in size. Aortic Valve  The aortic valve is normal in structure and function. No evidence of aortic valve regurgitation. Aorta  The aortic root is mildly dilated. Right Ventricle  The right ventricular size is normal.   Tricuspid Valve  Tricuspid valve is structurally normal.  Trivial tricuspid regurgitation. Right Atrium  The right atrium is mildly dilated. Pulmonic Valve  The pulmonic valve is normal in structure and function. No evidence of pulmonic valve regurgitation. Pericardial Effusion  No pericardial effusion noted. Pleural Effusion  No pleural effusion noted. Miscellaneous  IVC is normal in size (< 2.1 cm) and collapses > 50% with respiration  consistent with normal RA pressure (3 mmHg).   M-Mode/2D Measurements (cm)   LV Diastolic Dimension: 7.32 cm LV Systolic Dimension: 2.8 cm  LV Septum Diastolic: 1.29 cm  LV PW Diastolic: 1 cm           AO Root Dimension: 3.6 cm                                   LA Area: 20.3 cm2  LVOT: 2.1 cm                    LA volume/Index: 57.57 ml /25 ml/m2  Doppler Measurements   AV Peak Velocity: 128 cm/s     MV Peak E-Wave: 76.3 cm/s  AV Peak Gradient: 6.55 mmHg    MV Peak A-Wave: 68.1 cm/s  AV Mean Gradient: 4 mmHg       MV E/A Ratio: 1.12  LVOT Peak Velocity: 104 cm/s  AV Area (Continuity):2.07 cm2   TR Velocity:170 cm/s  TR Gradient:11.56 mmHg  Estimated RAP:3 mmHg  Estimated RVSP: 15 mmHg  E' Septal Velocity: 9.25 cm/s  E' Lateral Velocity: 18.2 cm/s  E/E' ratio: 6.2   Aortic Valve   Peak Velocity: 128 cm/s     Mean Velocity: 87.8 cm/s  Peak Gradient: 6.55 mmHg    Mean Gradient: 4 mmHg  Area (continuity): 2.07 cm2  AV VTI: 34.9 cm  Aorta   Aortic Root: 3.6 cm     Aortic Arch: 2.9 cm  Ascending Aorta: 3.4 cm  LVOT Diameter: 2.1 cm      CT ABDOMEN PELVIS W IV CONTRAST Additional Contrast? None    Result Date: 4/6/2022  EXAMINATION: CT OF THE ABDOMEN AND PELVIS WITH CONTRAST 4/6/2022 9:14 pm TECHNIQUE: CT of the abdomen and pelvis was performed with the administration of intravenous contrast. Multiplanar reformatted images are provided for review. Dose modulation, iterative reconstruction, and/or weight based adjustment of the mA/kV was utilized to reduce the radiation dose to as low as reasonably achievable. COMPARISON: None. HISTORY: ORDERING SYSTEM PROVIDED HISTORY: post op fever TECHNOLOGIST PROVIDED HISTORY: Reason for exam:->post op fever Additional Contrast?->None Decision Support Exception - unselect if not a suspected or confirmed emergency medical condition->Emergency Medical Condition (MA) Reason for Exam: Fall (Patient states that he was walking to the basement and tripped and fell down approx 14 steps this morning. He endorses loss of consciousness,knot on his head and states that he has thrown up several times throughout the day and is experiencing right sided flank pain.) FINDINGS: Lower Chest: The lung bases are clear. Organs: The liver is borderline enlarged with fatty replacement throughout. The spleen measures 14 cm in length and is normal density. There are a couple of partially calcified masses along the right lobe of the liver superiorly measuring up to 2.4 cm with smaller masses inferiorly and a few calcified masses in the left lobe with the largest laterally measuring 2 cm.  The gallbladder, pancreas, and bile ducts are normal.  The adrenals are normal.  The kidneys are normal size and function normally with no hydronephrosis or renal stones. The ureters are normal caliber. GI/bowel: There is a linear radiopaque stent extending through the posterior aspect of the distal body of the stomach and appears to extend into the splenic flexure of the colon. There are moderate punctate collections of free air scattered in the mesentery along the left upper quadrant. There is some minimal stranding in the mesentery around the splenic flexure extending inferiorly along the proximal left colon with no focal fluid or air collection. There is a 3 cm rounded area of mild enhancement or hyperdensity in the proximal aspect of the stent. There is mild punctate free air scattered along the pararenal space and upper abdomen which extends into the mesentery and into the lower abdomen and pelvis. The small bowel is normal caliber. Pelvis: The bladder is unremarkable. No adenopathy or ascites is seen in the pelvis. The prostate gland is mildly enlarged and partially calcified. The bladder is unremarkable. Peritoneum/Retroperitoneum:   There is a 4-5 cm fascial defect along the lower abdominal wall with peritoneal fat and bowel loops extending through the defect into the hernia which appear normal caliber with no bowel wall thickening or edema. The appendix is not well visualized. The aorta is unremarkable with no aneurysm or dissection and no retroperitoneal mass or adenopathy is seen. Bones/Soft Tissues: The bones are intact. No aggressive osseous lesion is seen. Moderate intraperitoneal and retroperitoneal free air seen throughout extending into the pararenal space and into the pelvis which is suspicious for a bowel perforation. Recommend surgical follow-up.  Surgical stent seen along the posterior wall of the distal body of the stomach extending into the splenic flexure of the colon with some minimal stranding around the colon in this area extending inferiorly and numerous small punctate air collections throughout the area extending along the retroperitoneum and pararenal space. This could be the etiology of the patient's perforation. There is a 3 cm hyperdensity in the stent which could represent enhancing tumor or clot. Recommend correlating with previous endoscopic findings. Probable calcified metastasis throughout the liver. Suggest PET scan correlation. Mild splenomegaly. Large 4-5 cm abdominal wall hernia around the light right lower pelvis with extensive bowel loops throughout the hernia which appear normal caliber with no obvious bowel obstruction or wall thickening seen. Mild prostatic enlargement and diffuse mild bladder wall thickening which may be due to poor distention or an element of bladder outlet obstruction. The findings were discussed with Dr. Joesph Bradley at 10 p.m. Scheduled Meds:   [START ON 4/10/2022] pantoprazole  40 mg Oral QAM AC    piperacillin-tazobactam  3,375 mg IntraVENous Q8H    losartan  50 mg Oral Daily    sodium chloride flush  5-40 mL IntraVENous 2 times per day    enoxaparin  40 mg SubCUTAneous Daily    fluconazole  200 mg IntraVENous Q24H     Continuous Infusions:   sodium chloride 50 mL/hr at 04/08/22 1639    sodium chloride 25 mL (04/08/22 0953)     PRN Meds:.morphine, sodium chloride flush, sodium chloride, ondansetron **OR** ondansetron, polyethylene glycol, acetaminophen **OR** acetaminophen      Assessment:  46 y.o. male admitted with   1. Perforated bowel ([address]    2.  Postoperative abdominal pain with fever        Pneumoperitoneum following colonoscopy  Metastatic colon cancer with primary lesion at splenic flexure causing obstruction   Prior colonoscopic stent, with migration and fistulization to stomach  Loop diverting colostomy in place with parastomal hernia, mild prolapse  Strep bacteremia  Hypertension  Rheumatoid arthritis      Plan:    Pt doing fine clinically  No fever, no pain on exam. Has been fine for a couple days  Regular diet  Suggest stop IV and plan discharge  Continue nonoperative management  Nothing else to do at this time.  Pt will see Dr. Greg Madsen post discharge  Can see back here if that doesn't work out    Fernando Kurtz MD

## 2022-04-09 NOTE — PROGRESS NOTES
Hospitalist Progress Note      PCP: Floyd Zhang MD    Date of Admission: 4/6/2022    Chief Complaint: Fever    Hospital Course: Patient with a history of colon cancer status post colostomy came in with fevers that started soon after colonoscopy at [facility] the day before. Found to have pneumoperitoneum. Surgery and GI consulted. Conservative management advised. Tolerating full liquids okay. Found to have strep bacteremia. ID consulted. Echocardiogram pending. Subjective: Tolerating full liquids okay. Had exam this morning. Wife at bedside. Denies any complaints. Medications:  Reviewed    Infusion Medications    sodium chloride 50 mL/hr at 04/08/22 1639    sodium chloride 25 mL (04/08/22 0953)     Scheduled Medications    [START ON 4/10/2022] pantoprazole  40 mg Oral QAM AC    piperacillin-tazobactam  3,375 mg IntraVENous Q8H    losartan  50 mg Oral Daily    sodium chloride flush  5-40 mL IntraVENous 2 times per day    enoxaparin  40 mg SubCUTAneous Daily    fluconazole  200 mg IntraVENous Q24H     PRN Meds: morphine, sodium chloride flush, sodium chloride, ondansetron **OR** ondansetron, polyethylene glycol, acetaminophen **OR** acetaminophen      Intake/Output Summary (Last 24 hours) at 4/9/2022 1619  Last data filed at 4/9/2022 0911  Gross per 24 hour   Intake 360 ml   Output --   Net 360 ml       Physical Exam Performed:    /73   Pulse 80   Temp 98.4 °F (36.9 °C) (Oral)   Resp 16   Ht 5' 11\" (1.803 m)   Wt 240 lb (108.9 kg)   SpO2 98%   BMI 33.47 kg/m²     General appearance: No apparent distress, appears stated age and cooperative. HEENT: Pupils equal, round, and reactive to light. Conjunctivae/corneas clear. Neck: Supple, with full range of motion. No jugular venous distention. Trachea midline. Respiratory:  Normal respiratory effort. Clear to auscultation, bilaterally without Rales/Wheezes/Rhonchi.   Cardiovascular: Regular rate and rhythm with normal S1/S2 without murmurs, rubs or gallops. Abdomen: Soft, non-tender, colostomy bag in the center. With normal bowel sounds. Musculoskeletal: No clubbing, cyanosis or edema bilaterally. Full range of motion without deformity. Skin: Skin color, texture, turgor normal.  No rashes or lesions. Neurologic:  Neurovascularly intact without any focal sensory/motor deficits. Cranial nerves: II-XII intact, grossly non-focal.  Psychiatric: Alert and oriented, thought content appropriate, normal insight  Capillary Refill: Brisk,3 seconds, normal   Peripheral Pulses: +2 palpable, equal bilaterally       Labs:   Recent Labs     04/07/22 0426 04/08/22  0835 04/09/22  0534   WBC 9.5 6.0 5.7   HGB 14.4 14.3 13.9   HCT 40.8 41.4 41.1    248 251     Recent Labs     04/06/22 2020 04/07/22  0426 04/09/22  0534    139 140   K 3.9 4.0 4.3    101 104   CO2 25 26 24   BUN 10 8 5*   CREATININE 1.1 0.9 1.0   CALCIUM 10.1 9.6 9.4     Recent Labs     04/06/22 2020   AST 21   ALT 16   BILITOT 0.5   ALKPHOS 54     No results for input(s): INR in the last 72 hours. No results for input(s): Joyice [place] in the last 72 hours. Urinalysis:    No results found for: Amanda Poot, [address], BACTERIA, RBCUA, BLOODU, [address], [place] [place] [address]    Radiology:  CT ABDOMEN PELVIS W IV CONTRAST Additional Contrast? None   Final Result   Moderate intraperitoneal and retroperitoneal free air seen throughout   extending into the pararenal space and into the pelvis which is suspicious   for a bowel perforation. Recommend surgical follow-up. Surgical stent seen along the posterior wall of the distal body of the   stomach extending into the splenic flexure of the colon with some minimal   stranding around the colon in this area extending inferiorly and numerous   small punctate air collections throughout the area extending along the   retroperitoneum and pararenal space. This could be the etiology of the   patient's perforation.   There is a 3 cm hyperdensity in the stent which could   represent enhancing tumor or clot. Recommend correlating with previous   endoscopic findings. Probable calcified metastasis throughout the liver. Suggest PET scan   correlation. Mild splenomegaly. Large 4-5 cm abdominal wall hernia around the light right lower pelvis with   extensive bowel loops throughout the hernia which appear normal caliber with   no obvious bowel obstruction or wall thickening seen. Mild prostatic enlargement and diffuse mild bladder wall thickening which may   be due to poor distention or an element of bladder outlet obstruction. The findings were discussed with Dr. Joesph Bradley at 10 p.m. Assessment/Plan:    Active Hospital Problems    Diagnosis     Weight loss counseling, encounter for [Z71.3]     Perforated bowel ([facility] [address] [address] [K63.1]     Postoperative abdominal pain with fever [R10.9, G89.18, R50.82]     Fecal peritonitis (HCC) [K65.8]     SIRS (systemic inflammatory response syndrome) (HCC) [R65.10]     Streptococcal bacteremia [R78.81, B95.5]     Colon cancer metastasized to liver ([facility] [address] [C18.9, C78.7]     Low grade fever [R50.9]     Essential hypertension [I10]     Class 1 obesity due to excess calories with body mass index (BMI) of 33.0 to 33.9 in adult [E66.09, Z68.33]     Pneumoperitoneum [K66.8]      Pneumoperitoneum: History of recent colonoscopy. CT abdomen noted with migration of the colonic stent into the stomach, possible etiology. General surgery consulted. Tolerating full liquids. Continue gentle hydration. No interventions planned at this time.  -Need to treat infection    Strep bacteremia: 2 x 2 cultures positive. ID on board. On IV Zosyn and Diflucan. Leukocytosis resolved. Echocardiogram pending. Speciation and susceptibility pending. History of colon cancer status post colostomy, radiation, chemotherapy    Hypertension: Continue Cozaar.   Holding hydrochlorothiazide. History of rheumatoid arthritis    DVT Prophylaxis: Lovenox  Diet: ADULT DIET;  Regular  Code Status: Full Code    PT/OT Eval Status: Not needed    Dispo -1 to 2 days pending final sensitivity and culture  Li Gutierrez MD

## 2022-04-09 NOTE — PROGRESS NOTES
Pt upgraded to regular diet this shift.  Family provided lunch with patient eating 100% and tolerated well

## 2022-04-10 VITALS
WEIGHT: 240 LBS | DIASTOLIC BLOOD PRESSURE: 72 MMHG | HEIGHT: 71 IN | TEMPERATURE: 98.1 F | HEART RATE: 74 BPM | OXYGEN SATURATION: 97 % | BODY MASS INDEX: 33.6 KG/M2 | SYSTOLIC BLOOD PRESSURE: 125 MMHG | RESPIRATION RATE: 18 BRPM

## 2022-04-10 LAB
ANION GAP SERPL CALCULATED.3IONS-SCNC: 12 MMOL/L (ref 3–16)
BLOOD CULTURE, ROUTINE: ABNORMAL
BLOOD CULTURE, ROUTINE: ABNORMAL
BUN BLDV-MCNC: 7 MG/DL (ref 7–20)
CALCIUM SERPL-MCNC: 9.2 MG/DL (ref 8.3–10.6)
CHLORIDE BLD-SCNC: 108 MMOL/L (ref 99–110)
CO2: 18 MMOL/L (ref 21–32)
CREAT SERPL-MCNC: 1.1 MG/DL (ref 0.9–1.3)
CULTURE, BLOOD 2: ABNORMAL
GFR AFRICAN AMERICAN: >60
GFR NON-AFRICAN AMERICAN: >60
GLUCOSE BLD-MCNC: 95 MG/DL (ref 70–99)
HCT VFR BLD CALC: 40.5 % (ref 40.5–52.5)
HEMOGLOBIN: 13.7 G/DL (ref 13.5–17.5)
MCH RBC QN AUTO: 32.1 PG (ref 26–34)
MCHC RBC AUTO-ENTMCNC: 33.7 G/DL (ref 31–36)
MCV RBC AUTO: 95.2 FL (ref 80–100)
ORGANISM: ABNORMAL
PDW BLD-RTO: 13.7 % (ref 12.4–15.4)
PLATELET # BLD: 245 K/UL (ref 135–450)
PMV BLD AUTO: 7.4 FL (ref 5–10.5)
POTASSIUM REFLEX MAGNESIUM: 5 MMOL/L (ref 3.5–5.1)
RBC # BLD: 4.26 M/UL (ref 4.2–5.9)
REASON FOR REJECTION: NORMAL
REJECTED TEST: NORMAL
SODIUM BLD-SCNC: 138 MMOL/L (ref 136–145)
WBC # BLD: 5.7 K/UL (ref 4–11)

## 2022-04-10 PROCEDURE — 6360000002 HC RX W HCPCS: Performed by: FAMILY MEDICINE

## 2022-04-10 PROCEDURE — 6370000000 HC RX 637 (ALT 250 FOR IP): Performed by: FAMILY MEDICINE

## 2022-04-10 PROCEDURE — 85027 COMPLETE CBC AUTOMATED: CPT

## 2022-04-10 PROCEDURE — 36415 COLL VENOUS BLD VENIPUNCTURE: CPT

## 2022-04-10 PROCEDURE — 80048 BASIC METABOLIC PNL TOTAL CA: CPT

## 2022-04-10 PROCEDURE — 99231 SBSQ HOSP IP/OBS SF/LOW 25: CPT | Performed by: SURGERY

## 2022-04-10 PROCEDURE — 6370000000 HC RX 637 (ALT 250 FOR IP): Performed by: HOSPITALIST

## 2022-04-10 PROCEDURE — 99232 SBSQ HOSP IP/OBS MODERATE 35: CPT | Performed by: INTERNAL MEDICINE

## 2022-04-10 PROCEDURE — 2580000003 HC RX 258: Performed by: FAMILY MEDICINE

## 2022-04-10 PROCEDURE — 6360000002 HC RX W HCPCS: Performed by: INTERNAL MEDICINE

## 2022-04-10 RX ORDER — AMOXICILLIN AND CLAVULANATE POTASSIUM 875; 125 MG/1; MG/1
1 TABLET, FILM COATED ORAL 2 TIMES DAILY
Qty: 20 TABLET | Refills: 0 | Status: SHIPPED | OUTPATIENT
Start: 2022-04-10 | End: 2022-04-20

## 2022-04-10 RX ADMIN — PANTOPRAZOLE SODIUM 40 MG: 40 TABLET, DELAYED RELEASE ORAL at 06:41

## 2022-04-10 RX ADMIN — FLUCONAZOLE 200 MG: 2 INJECTION, SOLUTION INTRAVENOUS at 16:25

## 2022-04-10 RX ADMIN — PIPERACILLIN AND TAZOBACTAM 3375 MG: 3; .375 INJECTION, POWDER, LYOPHILIZED, FOR SOLUTION INTRAVENOUS at 03:25

## 2022-04-10 RX ADMIN — PIPERACILLIN AND TAZOBACTAM 3375 MG: 3; .375 INJECTION, POWDER, LYOPHILIZED, FOR SOLUTION INTRAVENOUS at 10:45

## 2022-04-10 RX ADMIN — ENOXAPARIN SODIUM 40 MG: 40 INJECTION SUBCUTANEOUS at 10:36

## 2022-04-10 RX ADMIN — LOSARTAN POTASSIUM 50 MG: 25 TABLET, FILM COATED ORAL at 10:36

## 2022-04-10 RX ADMIN — SODIUM CHLORIDE, PRESERVATIVE FREE 10 ML: 5 INJECTION INTRAVENOUS at 10:36

## 2022-04-10 ASSESSMENT — PAIN SCALES - GENERAL
PAINLEVEL_OUTOF10: 0
PAINLEVEL_OUTOF10: 0

## 2022-04-10 NOTE — PROGRESS NOTES
ID Follow-up NOTE    CC:   Fever   Antibiotics: Zosyn, Fluconazole    Admit Date: 2022  Hospital Day: 5    Subjective:     Patient feels good / fine -   No abd pain. Wants to go home    Objective:     Patient Vitals for the past 8 hrs:   BP Temp Temp src Pulse Resp SpO2   04/10/22 1615 125/72 98.1 °F (36.7 °C) Oral 74 18 97 %     I/O last 3 completed shifts: In: 840 [P.O.:840]  Out: -   No intake/output data recorded. EXAM:  GENERAL: No apparent distress.     HEENT: Membranes moist, no oral lesion  NECK:  Supple, no lymphadenopathy  LUNGS: Clear b/l, no rales, no dullness  CARDIAC: RRR, no murmur appreciated  ABD:  + BS, soft / NT  EXT:  No rash, no edema, no lesions  NEURO: No focal neurologic findings  PSYCH: Orientation, sensorium, mood normal  LINES:  Peripheral iv       Data Review:  Lab Results   Component Value Date    WBC 5.7 04/10/2022    HGB 13.7 04/10/2022    HCT 40.5 04/10/2022    MCV 95.2 04/10/2022     04/10/2022     Lab Results   Component Value Date    CREATININE 1.1 04/10/2022    BUN 7 04/10/2022     04/10/2022    K 5.0 04/10/2022     04/10/2022    CO2 18 (L) 04/10/2022       Hepatic Function Panel:   Lab Results   Component Value Date    ALKPHOS 54 2022    ALT 16 2022    AST 21 2022    PROT 7.5 2022    BILITOT 0.5 2022    LABALBU 4.7 2022       MICRO:   SARS CoV-2, Influenza A/B PCR neg   BC x 2 S mitis, S salivaris   BC x 2 no growth to date    IMAGIN/6 Abd / Pelvic CT  Impression   Moderate intraperitoneal and retroperitoneal free air seen throughout   extending into the pararenal space and into the pelvis which is suspicious   for a bowel perforation.  Recommend surgical follow-up.       Surgical stent seen along the posterior wall of the distal body of the   stomach extending into the splenic flexure of the colon with some minimal   stranding around the colon in this area extending inferiorly and numerous   small punctate air collections throughout the area extending along the   retroperitoneum and pararenal space.  This could be the etiology of the   patient's perforation. [place] is a 3 cm hyperdensity in the stent which could   represent enhancing tumor or clot.  Recommend correlating with previous   endoscopic findings.       Probable calcified metastasis throughout the liver.  Suggest PET scan   correlation.       Mild splenomegaly.       Large 4-5 cm abdominal wall hernia around the light right lower pelvis with   extensive bowel loops throughout the hernia which appear normal caliber with   no obvious bowel obstruction or wall thickening seen.       Mild prostatic enlargement and diffuse mild bladder wall thickening which may   be due to poor distention or an element of bladder outlet obstruction. Scheduled Meds:   pantoprazole  40 mg Oral QAM AC    piperacillin-tazobactam  3,375 mg IntraVENous Q8H    losartan  50 mg Oral Daily    sodium chloride flush  5-40 mL IntraVENous 2 times per day    enoxaparin  40 mg SubCUTAneous Daily    fluconazole  200 mg IntraVENous Q24H       Continuous Infusions:   sodium chloride 50 mL/hr at 04/08/22 1639    sodium chloride 25 mL (04/08/22 0953)       PRN Meds:  morphine, sodium chloride flush, sodium chloride, ondansetron **OR** ondansetron, polyethylene glycol, acetaminophen **OR** acetaminophen      Assessment:     Colon ca, s/p resection, colostomy  Has stent     Colonoscopy on day of admission - fever / chills    - CT with free air   - BC  with Strep bacteremia   - c/c bowel perf / air into stent, peritoneum complicating colonoscopy     Fever - resolved  Normal WBC    Plan:     Home on augmentin 875 mg po bid x 7 days  F/u GI, Dr Estrella Fraction Making:   The following items were considered in medical decision making:  Discussion of patient care with other providers  Reviewed clinical lab tests  Reviewed radiology tests  Reviewed other diagnostic tests/interventions  Microbiology cultures and other micro tests reviewed      Discussed with pt, wife, daughter, RN  Benjamín Mejia MD

## 2022-04-10 NOTE — PROGRESS NOTES
Hospitalist Progress Note      PCP: Lovely Solis MD    Date of Admission: 4/6/2022    Chief Complaint: Fever    Hospital Course: Patient with a history of colon cancer status post colostomy came in with fevers that started soon after colonoscopy at [facility] the day before. Found to have pneumoperitoneum. Surgery and GI consulted. Conservative management advised. Tolerating full liquids okay. Found to have strep bacteremia. ID consulted. Echocardiogram pending. Subjective: Tolerating full liquids okay. Had exam this morning. Wife at bedside. Denies any complaints. Medications:  Reviewed    Infusion Medications    sodium chloride 50 mL/hr at 04/08/22 1639    sodium chloride 25 mL (04/08/22 0953)     Scheduled Medications    pantoprazole  40 mg Oral QAM AC    piperacillin-tazobactam  3,375 mg IntraVENous Q8H    losartan  50 mg Oral Daily    sodium chloride flush  5-40 mL IntraVENous 2 times per day    enoxaparin  40 mg SubCUTAneous Daily    fluconazole  200 mg IntraVENous Q24H     PRN Meds: morphine, sodium chloride flush, sodium chloride, ondansetron **OR** ondansetron, polyethylene glycol, acetaminophen **OR** acetaminophen      Intake/Output Summary (Last 24 hours) at 4/10/2022 1223  Last data filed at 4/9/2022 1330  Gross per 24 hour   Intake 480 ml   Output --   Net 480 ml       Physical Exam Performed:    /76   Pulse 74   Temp 97.9 °F (36.6 °C) (Oral)   Resp 16   Ht 5' 11\" (1.803 m)   Wt 240 lb (108.9 kg)   SpO2 95%   BMI 33.47 kg/m²     General appearance: No apparent distress, appears stated age and cooperative. HEENT: Pupils equal, round, and reactive to light. Conjunctivae/corneas clear. Neck: Supple, with full range of motion. No jugular venous distention. Trachea midline. Respiratory:  Normal respiratory effort. Clear to auscultation, bilaterally without Rales/Wheezes/Rhonchi.   Cardiovascular: Regular rate and rhythm with normal S1/S2 without murmurs, rubs or gallops. Abdomen: Soft, non-tender, colostomy bag in the center. With normal bowel sounds. Musculoskeletal: No clubbing, cyanosis or edema bilaterally. Full range of motion without deformity. Skin: Skin color, texture, turgor normal.  No rashes or lesions. Neurologic:  Neurovascularly intact without any focal sensory/motor deficits. Cranial nerves: II-XII intact, grossly non-focal.  Psychiatric: Alert and oriented, thought content appropriate, normal insight  Capillary Refill: Brisk,3 seconds, normal   Peripheral Pulses: +2 palpable, equal bilaterally       Labs:   Recent Labs     04/08/22  0835 04/09/22  0534 04/10/22  0628   WBC 6.0 5.7 5.7   HGB 14.3 13.9 13.7   HCT 41.4 41.1 40.5    251 245     Recent Labs     04/09/22  0534 04/10/22  0502    138   K 4.3 5.0    108   CO2 24 18*   BUN 5* 7   CREATININE 1.0 1.1   CALCIUM 9.4 9.2     No results for input(s): AST, ALT, BILIDIR, BILITOT, ALKPHOS in the last 72 hours. No results for input(s): INR in the last 72 hours. No results for input(s): Donah Keens in the last 72 hours. Urinalysis:    No results found for: Leim Karolinesher, [address], BACTERIA, RBCUA, BLOODU, [address], Josey Marquez    Radiology:  CT ABDOMEN PELVIS W IV CONTRAST Additional Contrast? None   Final Result   Moderate intraperitoneal and retroperitoneal free air seen throughout   extending into the pararenal space and into the pelvis which is suspicious   for a bowel perforation. Recommend surgical follow-up. Surgical stent seen along the posterior wall of the distal body of the   stomach extending into the splenic flexure of the colon with some minimal   stranding around the colon in this area extending inferiorly and numerous   small punctate air collections throughout the area extending along the   retroperitoneum and pararenal space. This could be the etiology of the   patient's perforation.   There is a 3 cm hyperdensity in the stent which could represent enhancing tumor or clot. Recommend correlating with previous   endoscopic findings. Probable calcified metastasis throughout the liver. Suggest PET scan   correlation. Mild splenomegaly. Large 4-5 cm abdominal wall hernia around the light right lower pelvis with   extensive bowel loops throughout the hernia which appear normal caliber with   no obvious bowel obstruction or wall thickening seen. Mild prostatic enlargement and diffuse mild bladder wall thickening which may   be due to poor distention or an element of bladder outlet obstruction. The findings were discussed with Dr. Shirley Irving at 10 p.m. Assessment/Plan:    Active Hospital Problems    Diagnosis     Malignant neoplasm of splenic flexure ([address] [C18.5]     Colon obstruction ([address] [K56.609]     Weight loss counseling, encounter for [Z71.3]     Perforated bowel ([address] [K63.1]     Postoperative abdominal pain with fever [R10.9, G89.18, R50.82]     Fecal peritonitis ([address] [K65.8]     SIRS (systemic inflammatory response syndrome) (HCC) [R65.10]     Streptococcal bacteremia [R78.81, B95.5]     Colon cancer metastasized to liver ([address] [C18.9, C78.7]     Low grade fever [R50.9]     Essential hypertension [I10]     Class 1 obesity due to excess calories with body mass index (BMI) of 33.0 to 33.9 in adult [E66.09, Z68.33]     Pneumoperitoneum [K66.8]      Pneumoperitoneum: History of recent colonoscopy. CT abdomen noted with migration of the colonic stent into the stomach, possible etiology. General surgery consulted. Tolerating full liquids. Continue gentle hydration. No interventions planned at this time.  -Need to treat infection    Strep bacteremia: 2 x 2 cultures positive. ID on board. ukocytosis resolved. Echocardiogram normal   Sensitivity noted. History of colon cancer status post colostomy, radiation, chemotherapy    Hypertension: Continue Cozaar.   Holding hydrochlorothiazide. History of rheumatoid arthritis    DVT Prophylaxis: Lovenox  Diet: ADULT DIET;  Regular  Code Status: Full Code    PT/OT Eval Status: Not needed    Dispo  Once cleared by ID and final abx regimen   Justen Arenas MD

## 2022-04-10 NOTE — PROGRESS NOTES
Gastroenterology Progress Note    Yadira Hamlin is a 46 y.o. male patient. 1. Perforated bowel ([address]    2. Postoperative abdominal pain with fever        Admission Date: 2022  Hospital Day: Hospital Day: 5  Attending: Hallie Norman MD  Date of service: 4/10/22    SUBJECTIVE:    No complaints   Tolerating solid food since Friday evening     ROS:  Cardiovascular ROS: no chest pain or dyspnea on exertion  Gastrointestinal ROS: see above  Respiratory ROS: no cough, shortness of breath, or wheezing    Physical    VITALS:  /74   Pulse 70   Temp 98 °F (36.7 °C) (Oral)   Resp 16   Ht 5' 11\" (1.803 m)   Wt 240 lb (108.9 kg)   SpO2 97%   BMI 33.47 kg/m²   TEMPERATURE:  Current - Temp: 98 °F (36.7 °C); Max - Temp  Av.3 °F (36.8 °C)  Min: 98 °F (36.7 °C)  Max: 98.4 °F (36.9 °C)    NAD  RRR, Nl s1s2  Lungs CTA Bilaterally, normal effort  Abdomen soft, ND, NT, no HSM, Bowel sounds normal, + stoma   AAOx3, No asterixis     Data      CBC:   Recent Labs     22  0835 22  0534 04/10/22  0628   WBC 6.0 5.7 5.7   RBC 4.40 4.32 4.26   HGB 14.3 13.9 13.7   HCT 41.4 41.1 40.5    251 245   MCV 94.0 95.2 95.2   MCH 32.5 32.1 32.1   MCHC 34.6 33.7 33.7   RDW 13.9 13.7 13.7        BMP:  Recent Labs     22  0534 04/10/22  0502    138   K 4.3 5.0    108   CO2 24 18*   BUN 5* 7   CREATININE 1.0 1.1   CALCIUM 9.4 9.2   GLUCOSE 101* 95        Hepatic Function Panel:   No results for input(s): AST, ALT, BILIDIR, BILITOT, ALKPHOS in the last 72 hours. No results for input(s): LIPASE, AMYLASE in the last 72 hours. No results for input(s): PROTIME, INR in the last 72 hours. No results for input(s): PTT in the last 72 hours. No results for input(s): OCCULTBLD in the last 72 hours.       Radiology Review:    CT ABDOMEN PELVIS W IV CONTRAST Additional Contrast? None   Final Result   Moderate intraperitoneal and retroperitoneal free air seen throughout   extending into the pararenal space and into the pelvis which is suspicious   for a bowel perforation. Recommend surgical follow-up. Surgical stent seen along the posterior wall of the distal body of the   stomach extending into the splenic flexure of the colon with some minimal   stranding around the colon in this area extending inferiorly and numerous   small punctate air collections throughout the area extending along the   retroperitoneum and pararenal space. This could be the etiology of the   patient's perforation. There is a 3 cm hyperdensity in the stent which could   represent enhancing tumor or clot. Recommend correlating with previous   endoscopic findings. Probable calcified metastasis throughout the liver. Suggest PET scan   correlation. Mild splenomegaly. Large 4-5 cm abdominal wall hernia around the light right lower pelvis with   extensive bowel loops throughout the hernia which appear normal caliber with   no obvious bowel obstruction or wall thickening seen. Mild prostatic enlargement and diffuse mild bladder wall thickening which may   be due to poor distention or an element of bladder outlet obstruction. The findings were discussed with Dr. Jon Rogers at 10 p.m. ASSESSMENT   Pneumoperitoneum with recent colonoscopy. CT abdomen noted with migration of the colonic stent into the stomach. General surgery on board.      Strep bacteremia. ID on board. On IV Zosyn and Diflucan. Leukocytosis resolved.   Repeat BCx 4/8 negative X 2    PLAN    Patient can be discharged from GI standpoint   Awaiting ID's input on an antibiotic home regimen   Follow up with Dr. Jon Rogers (GI) once discharged    Kalpesh Crabtree MD, MSc  [facility]  04/10/22

## 2022-04-10 NOTE — PROGRESS NOTES
[address] [facility]        Progress Note    Patient Name: Corrie Mera  MRN: [id number]  YOB: 1970  Date of Evaluation: 4/10/2022    Chief Complaint: Fevers, chills    Subjective:  No acute events overnight, in good spirits  Denies abdominal pain  No nausea or vomiting, tolerating full liquid diet  Normal enteric output per colostomy; denies bloating  Resting in bed at this time      Vital Signs:  Patient Vitals for the past 24 hrs:   BP Temp Temp src Pulse Resp SpO2   04/10/22 0900 128/76 97.9 °F (36.6 °C) Oral 74 16 95 %   04/10/22 0000 107/74 98 °F (36.7 °C) Oral 70 16 97 %   22 1650 121/80 98.4 °F (36.9 °C) Oral 66 16 95 %   22 1330 119/73 98.4 °F (36.9 °C) Oral 80 16 --   22 1210 -- -- -- -- 16 98 %      TEMPERATURE HISTORY 24H: Temp (24hrs), Av.2 °F (36.8 °C), Min:97.9 °F (36.6 °C), Max:98.4 °F (36.9 °C)    BLOOD PRESSURE HISTORY: Systolic (06EUY), HWL:578 , Min:107 , YGV:416    Diastolic (45LQE), YKF:76, Min:71, Max:80      Intake/Output:  I/O last 3 completed shifts: In: 840 [P.O.:840]  Out: -   No intake/output data recorded. Drain/tube Output:       Physical Exam:  General: awake, alert, oriented to person, place, time  Cardiovascular:  regular rate and rhythm and no murmur noted  Lungs: clear to auscultation  Abdomen: soft, nontender, nondistended, bowel sounds normal, stoma red/viable with peristoma hernia and prolapse, enteric output in pouch     Labs:  CBC:    Recent Labs     22  0835 22  0534 04/10/22  0628   WBC 6.0 5.7 5.7   HGB 14.3 13.9 13.7   HCT 41.4 41.1 40.5    251 245     BMP:    Recent Labs     22  0534 04/10/22  0502    138   K 4.3 5.0    108   CO2 24 18*   BUN 5* 7   CREATININE 1.0 1.1   GLUCOSE 101* 95     Hepatic:    No results for input(s): AST, ALT, ALB, BILITOT, ALKPHOS in the last 72 hours.   Amylase:    Lab Results   Component Value Date    AMYLASE 44 2016     Lipase: Lab Results   Component Value Date    LIPASE 14.0 09/21/2016      Mag:  No results found for: MG  Phos:   No results found for: PHOS   Coags: No results found for: PROTIME, INR, APTT    Cultures:  Anaerobic culture  No results found for: LABANAE  Fungus stain  No results found for requested labs within last 30 days. Gram stain  No results found for requested labs within last 30 days. Organism  Lab Results   Component Value Date/Time    ORG Streptococcus mitis/oralis (A) 04/06/2022 08:20 PM    ORG Streptococcus salivarius group (A) 04/06/2022 08:20 PM    ORG Streptococcus species DNA Detected (A) 04/06/2022 08:20 PM    ORG Streptococcus mitis/oralis (A) 04/06/2022 08:20 PM    ORG Streptococcus salivarius group (A) 04/06/2022 08:20 PM     Surgical culture  No results found for: CXSURG  Blood culture 1  Results in Past 30 Days  Result Component Current Result Ref Range Previous Result Ref Range   Blood Culture, Routine No Growth to date. Any change in status will be called. (4/8/2022)  POSITIVE for  Isolated two of two sets  No further workup  Refer to culture [id number] for sensitivity results   (4/6/2022)      Blood culture 2  Results in Past 30 Days  Result Component Current Result Ref Range Previous Result Ref Range   Culture, Blood 2 No Growth to date. Any change in status will be called. (4/8/2022)  See additional report for complete BCID panel. (4/6/2022)      Fecal occult  No results found for requested labs within last 30 days. GI bacterial pathogens by PCR  No results found for requested labs within last 30 days. C. difficile  No results found for requested labs within last 30 days.      Urine culture  No results found for: LABURIN    Pathology:  No relevant pathology     Imaging:  I have personally reviewed the following films:    ECHO Complete 2D W Doppler W Color    Result Date: 4/8/2022  Transthoracic Echocardiography Report (TTE)  Demographics   Patient Name      [address] [facility]   Date of Study     04/08/2022          Gender              Male   Patient Number    [id number]          Date of Birth       1970   Visit Number      [id number]           Age                 46 year(s)   Accession Number  [id number]          Room Number         7986   Corporate ID      [id number]            Sonographer         Sridhar Yusuf T   Ordering          Shakira Sin MD  Interpreting        Yari Sanchez MD,  Physician                             Physician           Len Almaznar  Procedure Type of Study   TTE procedure:ECHOCARDIOGRAM COMPLETE 2D W DOPPLER W COLOR. Procedure Date Date: 04/08/2022 Start: 09:36 AM Study Location: [facility] - Echo Lab Technical Quality: Adequate visualization Additional Indications:R/o endocarditis. Patient Status: Routine Height: 71 inches Weight: 240.01 pounds BSA: 2.28 m2 BMI: 33.47 kg/m2 HR: 64 bpm BP: 109/73 mmHg  Conclusions   Summary  Left ventricular systolic function is normal with ejection fraction  estimated at 50 %. Normal left ventricular wall thickness. The aortic root is mildly dilated. Signature   ------------------------------------------------------------------  Electronically signed by Yari Sanchez MD, [facility] [place], [address]  (Interpreting physician) on 04/08/2022 at 12:54 PM  ------------------------------------------------------------------   Findings   Left Ventricle  Left ventricular systolic function is normal with ejection fraction  estimated at 50 %. Normal left ventricular wall thickness. Left ventricle size is normal.   Mitral Valve  Mitral valve is structurally normal.  Trivial mitral regurgitation. Left Atrium  The left atrium is normal in size. Aortic Valve  The aortic valve is normal in structure and function. No evidence of aortic valve regurgitation. Aorta  The aortic root is mildly dilated.    Right Ventricle  The right ventricular size is normal.   Tricuspid Valve Tricuspid valve is structurally normal.  Trivial tricuspid regurgitation. Right Atrium  The right atrium is mildly dilated. Pulmonic Valve  The pulmonic valve is normal in structure and function. No evidence of pulmonic valve regurgitation. Pericardial Effusion  No pericardial effusion noted. Pleural Effusion  No pleural effusion noted. Miscellaneous  IVC is normal in size (< 2.1 cm) and collapses > 50% with respiration  consistent with normal RA pressure (3 mmHg). M-Mode/2D Measurements (cm)   LV Diastolic Dimension: 1.51 cm LV Systolic Dimension: 2.8 cm  LV Septum Diastolic: 3.86 cm  LV PW Diastolic: 1 cm           AO Root Dimension: 3.6 cm                                   LA Area: 20.3 cm2  LVOT: 2.1 cm                    LA volume/Index: 57.57 ml /25 ml/m2  Doppler Measurements   AV Peak Velocity: 128 cm/s     MV Peak E-Wave: 76.3 cm/s  AV Peak Gradient: 6.55 mmHg    MV Peak A-Wave: 68.1 cm/s  AV Mean Gradient: 4 mmHg       MV E/A Ratio: 1.12  LVOT Peak Velocity: 104 cm/s  AV Area (Continuity):2.07 cm2   TR Velocity:170 cm/s  TR Gradient:11.56 mmHg  Estimated RAP:3 mmHg  Estimated RVSP: 15 mmHg  E' Septal Velocity: 9.25 cm/s  E' Lateral Velocity: 18.2 cm/s  E/E' ratio: 6.2   Aortic Valve   Peak Velocity: 128 cm/s     Mean Velocity: 87.8 cm/s  Peak Gradient: 6.55 mmHg    Mean Gradient: 4 mmHg  Area (continuity): 2.07 cm2  AV VTI: 34.9 cm  Aorta   Aortic Root: 3.6 cm     Aortic Arch: 2.9 cm  Ascending Aorta: 3.4 cm  LVOT Diameter: 2.1 cm      CT ABDOMEN PELVIS W IV CONTRAST Additional Contrast? None    Result Date: 4/6/2022  EXAMINATION: CT OF THE ABDOMEN AND PELVIS WITH CONTRAST 4/6/2022 9:14 pm TECHNIQUE: CT of the abdomen and pelvis was performed with the administration of intravenous contrast. Multiplanar reformatted images are provided for review.  Dose modulation, iterative reconstruction, and/or weight based adjustment of the mA/kV was utilized to reduce the radiation dose to as low as reasonably achievable. COMPARISON: None. HISTORY: ORDERING SYSTEM PROVIDED HISTORY: post op fever TECHNOLOGIST PROVIDED HISTORY: Reason for exam:->post op fever Additional Contrast?->None Decision Support Exception - unselect if not a suspected or confirmed emergency medical condition->Emergency Medical Condition (MA) Reason for Exam: Fall (Patient states that he was walking to the basement and tripped and fell down approx 14 steps this morning. He endorses loss of consciousness,knot on his head and states that he has thrown up several times throughout the day and is experiencing right sided flank pain.) FINDINGS: Lower Chest: The lung bases are clear. Organs: The liver is borderline enlarged with fatty replacement throughout. The spleen measures 14 cm in length and is normal density. There are a couple of partially calcified masses along the right lobe of the liver superiorly measuring up to 2.4 cm with smaller masses inferiorly and a few calcified masses in the left lobe with the largest laterally measuring 2 cm. The gallbladder, pancreas, and bile ducts are normal.  The adrenals are normal.  The kidneys are normal size and function normally with no hydronephrosis or renal stones. The ureters are normal caliber. GI/bowel: There is a linear radiopaque stent extending through the posterior aspect of the distal body of the stomach and appears to extend into the splenic flexure of the colon. There are moderate punctate collections of free air scattered in the mesentery along the left upper quadrant. There is some minimal stranding in the mesentery around the splenic flexure extending inferiorly along the proximal left colon with no focal fluid or air collection. There is a 3 cm rounded area of mild enhancement or hyperdensity in the proximal aspect of the stent.   There is mild punctate free air scattered along the pararenal space and upper abdomen which extends into the mesentery and into the lower abdomen and pelvis. The small bowel is normal caliber. Pelvis: The bladder is unremarkable. No adenopathy or ascites is seen in the pelvis. The prostate gland is mildly enlarged and partially calcified. The bladder is unremarkable. Peritoneum/Retroperitoneum:   There is a 4-5 cm fascial defect along the lower abdominal wall with peritoneal fat and bowel loops extending through the defect into the hernia which appear normal caliber with no bowel wall thickening or edema. The appendix is not well visualized. The aorta is unremarkable with no aneurysm or dissection and no retroperitoneal mass or adenopathy is seen. Bones/Soft Tissues: The bones are intact. No aggressive osseous lesion is seen. Moderate intraperitoneal and retroperitoneal free air seen throughout extending into the pararenal space and into the pelvis which is suspicious for a bowel perforation. Recommend surgical follow-up. Surgical stent seen along the posterior wall of the distal body of the stomach extending into the splenic flexure of the colon with some minimal stranding around the colon in this area extending inferiorly and numerous small punctate air collections throughout the area extending along the retroperitoneum and pararenal space. This could be the etiology of the patient's perforation. There is a 3 cm hyperdensity in the stent which could represent enhancing tumor or clot. Recommend correlating with previous endoscopic findings. Probable calcified metastasis throughout the liver. Suggest PET scan correlation. Mild splenomegaly. Large 4-5 cm abdominal wall hernia around the light right lower pelvis with extensive bowel loops throughout the hernia which appear normal caliber with no obvious bowel obstruction or wall thickening seen. Mild prostatic enlargement and diffuse mild bladder wall thickening which may be due to poor distention or an element of bladder outlet obstruction. The findings were discussed with Dr. Melchor Lott at 10 p.m. Scheduled Meds:   pantoprazole  40 mg Oral QAM AC    piperacillin-tazobactam  3,375 mg IntraVENous Q8H    losartan  50 mg Oral Daily    sodium chloride flush  5-40 mL IntraVENous 2 times per day    enoxaparin  40 mg SubCUTAneous Daily    fluconazole  200 mg IntraVENous Q24H     Continuous Infusions:   sodium chloride 50 mL/hr at 04/08/22 1639    sodium chloride 25 mL (04/08/22 0953)     PRN Meds:.morphine, sodium chloride flush, sodium chloride, ondansetron **OR** ondansetron, polyethylene glycol, acetaminophen **OR** acetaminophen      Assessment:  46 y.o. male admitted with   1. Perforated bowel ([facility] [address]    2. Postoperative abdominal pain with fever        Pneumoperitoneum following colonoscopy  Metastatic colon cancer with primary lesion at splenic flexure causing obstruction   Prior colonoscopic stent, with migration and fistulization to stomach  Loop diverting colostomy in place with parastomal hernia, mild prolapse  Strep bacteremia  Hypertension  Rheumatoid arthritis      Plan:    Pt doing fine clinically  No fever, no pain on exam  Regular diet  Suggest stop IV and plan discharge today  Continue nonoperative management  Nothing else inpt to do at this time.  Pt will see Dr. Kate Garcia post discharge  Can see back here as needed  Plan reviewed with pt and daughter this am    Janee Haney MD

## 2022-04-11 NOTE — DISCHARGE INSTR - COC
Continuity of Care Form    Patient Name: Nino Mckoy   :  1970  MRN:  [id number]    Admit date:  2022  Discharge date:  ***    Code Status Order: Prior   Advance Directives:      Admitting Physician:  Fadia Jaimes MD  PCP: Lamar Saldivar MD    Discharging Nurse: [facility] Unit/Room#: [id number]-[id number]/1752-26  Discharging Unit Phone Number: ***    Emergency Contact:   Extended Emergency Contact Information  Primary Emergency Contact: Chaparro Michael  Address: [address] [place] [address], [address] [place] [address] Drive  Home Phone: [phone]  Mobile Phone: [phone]  Relation: Spouse  Secondary Emergency Contact: [address] Phone: [phone]  Relation: Child    Past Surgical History:  Past Surgical History:   Procedure Laterality Date    ABDOMEN SURGERY      colectomy with ostomy    COLONOSCOPY      COLONOSCOPY N/A 2022    COLONOSCOPY WITH BIOPSY performed by Meri Felton MD at [place] [address] ENDOSCOPY N/A 2022    EGD BIOPSY performed by Meri Felton MD at [place] [address]       Immunization History:   Immunization History   Administered Date(s) Administered    COVID-19, Pfizer Purple top, DILUTE for use, 12+ yrs, 30mcg/0.3mL dose 2021, 2021, 10/23/2021       Active Problems:  Patient Active Problem List   Diagnosis Code    Pneumoperitoneum K66.8    Perforated bowel ([facility] K63.1    Postoperative abdominal pain with fever R10.9, G89.18, R50.82    Fecal peritonitis ([address] K65.8    SIRS (systemic inflammatory response syndrome) ([address] R65.10    Streptococcal bacteremia R78.81, B95.5    Colon cancer metastasized to liver ([address] C18.9, C78.7    Low grade fever R50.9    Essential hypertension I10    Class 1 obesity due to excess calories with body mass index (BMI) of 33.0 to 33.9 in adult E66.09, Z68.33    Weight loss counseling, encounter for Z71.3    Malignant neoplasm of splenic flexure ([address] C18.5 Colon obstruction ([facility] [address] K56.609       Isolation/Infection:   Isolation            No Isolation          Patient Infection Status       Infection Onset Added Last Indicated Last Indicated By Review Planned Expiration Resolved Resolved By    None active    Resolved    COVID-19 (Rule Out) 22 COVID-19 & Influenza Combo (Ordered)   22 Rule-Out Test Resulted            Nurse Assessment:  Last Vital Signs: /72   Pulse 74   Temp 98.1 °F (36.7 °C) (Oral)   Resp 18   Ht 5' 11\" (1.803 m)   Wt 240 lb (108.9 kg)   SpO2 97%   BMI 33.47 kg/m²     Last documented pain score (0-10 scale): Pain Level: 0  Last Weight:   Wt Readings from Last 1 Encounters:   22 240 lb (108.9 kg)     Mental Status:  {IP PT MENTAL STATUS:}    IV Access:  { SARAH IV ACCESS:286260194}    Nursing Mobility/ADLs:  Walking   {[facility] DME HBPR:217755168}  Transfer  {[facility] DME DKJS:764976815}  Bathing  {[facility] DME BELP:002886448}  Dressing  {[facility] DME JKAE:271589636}  Toileting  {[facility] DME IHAV:179554877}  Feeding  {[facility] DME PIIM:798071251}  Med Admin  {[facility] DME ASL}  Med Delivery   { SARAH MED Delivery:236542161}    Wound Care Documentation and Therapy:        Elimination:  Continence: Bowel: {YES / CV:90089}  Bladder: {YES / SE:15253}  Urinary Catheter: {Urinary Catheter:098707277}   Colostomy/Ileostomy/Ileal Conduit: {YES / CY:80622}  Colostomy RLQ Transverse-Stomal Appliance: Changed,Clean,Dry,Intact  Colostomy RLQ Transverse-Stoma  Assessment: Protrudes,Red  Colostomy RLQ Transverse-Stool Appearance: Loose  Colostomy RLQ Transverse-Stool Color: Brown    Date of Last BM: ***  No intake or output data in the 24 hours ending 22 1045  No intake/output data recorded.     Safety Concerns:     [address] [employer] Safety Concerns:760279764}    Impairments/Disabilities:      [address] [employer] Impairments/Disabilities:551489462}    Nutrition Therapy:  Current Nutrition Therapy:   [address] Eliana SMITH Diet List:944046067}    Routes of Feeding: {CHP DME Other Feedings:315083402}  Liquids: {Slp liquid thickness:93576}  Daily Fluid Restriction: {CHP DME Yes amt example:944381595}  Last Modified Barium Swallow with Video (Video Swallowing Test): {Done Not Done MXZD:375197674}    Treatments at the Time of Hospital Discharge:   Respiratory Treatments: ***  Oxygen Therapy:  {Therapy; copd oxygen:09289}  Ventilator:    { CC Vent YDUX:700500631}    Rehab Therapies: {THERAPEUTIC INTERVENTION:2550766859}  Weight Bearing Status/Restrictions: { CC Weight Bearin}  Other Medical Equipment (for information only, NOT a DME order):  {EQUIPMENT:799759269}  Other Treatments: ***    Patient's personal belongings (please select all that are sent with patient):  {CHP DME Belongings:967611054}    RN SIGNATURE:  {Esignature:276930253}    CASE MANAGEMENT/SOCIAL WORK SECTION    Inpatient Status Date: ***    Readmission Risk Assessment Score:  Readmission Risk              Risk of Unplanned Readmission:  0           Discharging to Facility/ Agency   Name:   Address:  Phone:  Fax:    Dialysis Facility (if applicable)   Name:  Address:  Dialysis Schedule:  Phone:  Fax:    / signature: {Esignature:044682876}    PHYSICIAN SECTION    Prognosis: {Prognosis:9834469512}    Condition at Discharge: [facility] Patient Condition:503306961}    Rehab Potential (if transferring to Rehab): {Prognosis:2148739861}    Recommended Labs or Other Treatments After Discharge: ***    Physician Certification: I certify the above information and transfer of Nico Curiel  is necessary for the continuing treatment of the diagnosis listed and that he requires {Admit to Appropriate Level of Care:96456} for {GREATER/LESS:846329886} 30 days.      Update Admission H&P: {CHP DME Changes in XJEKZ:596063251}    PHYSICIAN SIGNATURE:  {Esignature:852788497}

## 2022-04-11 NOTE — CARE COORDINATION
Discharge Planning:     (LUIS ANGEL) received a phone call from [address] [place] [address]) staff, Bulmaro Gonzales, who confirmed that the patient didn't need [facility] services as patient didn't discharge on IV antibiotics yesterday.       Winston PACHECO, MIGNON, [facility] -   [phone]    Electronically signed by TARA Thompson on 4/11/2022 at 10:45 AM

## 2022-04-12 LAB
BLOOD CULTURE, ROUTINE: NORMAL
CULTURE, BLOOD 2: NORMAL

## 2022-04-14 NOTE — PROGRESS NOTES
Physician Progress Note      PATIENTDevora Traore  CSN #:                  [id number]  :                       1970  ADMIT DATE:       2022 7:55 PM  [address] DATE:        4/10/2022 7:12 PM  RESPONDING  PROVIDER #:        Ashley Joe MD          QUERY TEXT:    Pt admitted with perforated bowel . Pt noted to have abnormal labs and fever. If possible, please document in the progress notes and discharge summary if   you are evaluating and /or treating any of the following: The medical record reflects the following:  Risk Factors: Perforated bowel  Clinical Indicators: WBC- 14.8 on admit, HR- 126 on admit, temp- 99.5 on   admit. Per ED note- \"pt states that he had a colonscopy today around 1300. pt   states he was fine after, had some food for dinner, then started having chills   and a fever of 100.7. pt took 2 500mg tylenol around 1830. \"  CT abdomen and   pelvis showed pneumoperitoneum. BC  with Strep bacteremia. Treatment: labs, CT Abd, ID/GI/Gen Surg consult, Zosyn, Diflucan, Echo  Options provided:  -- Sepsis with fecal peritonitis present on admission  -- fecal peritonitis without Sepsis  -- SIRS was ruled out  -- Other - I will add my own diagnosis  -- Disagree - Not applicable / Not valid  -- Disagree - Clinically unable to determine / Unknown  -- Refer to Clinical Documentation Reviewer    PROVIDER RESPONSE TEXT:    This patient has fecal peritonitis without Sepsis.     Query created by: Kel Tejada on 2022 11:55 AM      Electronically signed by:  Ashley Joe MD 2022 3:04 PM

## 2022-04-18 NOTE — DISCHARGE SUMMARY
[address] [place] [address] DISCHARGE SUMMARY    Patient Demographics    Sung Beltran Precise  Date of Birth. 1970  MRN. [id number]     Primary care provider. Cara Merrill MD  (Tel: [phone])    Admit date: 4/6/2022    Discharge date (blank if same as Note Date): 4/10/2022  Note Date: 4/18/2022     Reason for Hospitalization. Chief Complaint   Patient presents with    Post-op Problem     pt states that he had a colonscopy today around 1300. pt states he was fine after, had some food for dinner, then started having chills and a fever of 100.7. pt took 2 500mg tylenol around 1830. [facility] Course. Pneumoperitoneum  -With status post colonoscopy evaluated by neurosurgery. Due to significant improvement no need for neurosurgical invention patient tolerated diet at the time of discharge    Staph bacteremia with 2 operative culture positive ID evaluated the patient echocardiogram negative patient was discharged on p.o. antibiotics based on culture sensitivity repeat blood culture    Hemodynamically stable at the time of discharge no pain no leukocytosis    Consults. IP CONSULT TO GENERAL SURGERY  IP CONSULT TO INFECTIOUS DISEASES    Physical examination on discharge day. /72   Pulse 74   Temp 98.1 °F (36.7 °C) (Oral)   Resp 18   Ht 5' 11\" (1.803 m)   Wt 240 lb (108.9 kg)   SpO2 97%   BMI 33.47 kg/m²   General appearance. Alert. Looks comfortable. HEENT. Sclera clear. Moist mucus membranes. Cardiovascular. Regular rate and rhythm, normal S1, S2. No murmur. Respiratory. Not using accessory muscles. Clear to auscultation bilaterally, no wheeze. Gastrointestinal. Abdomen soft, non-tender, not distended, normal bowel sounds  Neurology. Facial symmetry. No speech deficits. Moving all extremities equally. Extremities. No edema in lower extremities. Skin.  Warm, dry, normal turgor    Condition at time of discharge stable     Medication instructions provided to patient at discharge. Medication List      START taking these medications    amoxicillin-clavulanate 875-125 MG per tablet  Commonly known as: AUGMENTIN  Take 1 tablet by mouth 2 times daily for 10 days        CONTINUE taking these medications    Allegra Allergy 180 MG tablet  Generic drug: fexofenadine     folic acid 1 MG tablet  Commonly known as: FOLVITE     hydroCHLOROthiazide 25 MG tablet  Commonly known as: HYDRODIURIL     INFLECTRA IV     losartan 100 MG tablet  Commonly known as: COZAAR     methotrexate 2.5 MG chemo tablet  Commonly known as: RHEUMATREX     pantoprazole 40 MG tablet  Commonly known as: PROTONIX     vitamin B-12 1000 MCG tablet  Commonly known as: CYANOCOBALAMIN     Vitamin D3 1.25 MG (55167 UT) Caps           Where to Get Your Medications      These medications were sent to [facility] [address] [place] [address], [facility] [phone]  [address] [place] [address], [address] [place] [id number]    Phone: [phone]   · amoxicillin-clavulanate 875-125 MG per tablet         Discharge recommendations given to patient. Follow Up. pcp in 1 week   Disposition. home  Activity. activity as tolerated  Diet: No diet orders on file      Spent 45  minutes in discharge process.     Signed:  Roxann Treadwell MD     4/18/2022 3:25 PM

## 2022-08-04 ENCOUNTER — ANESTHESIA EVENT (OUTPATIENT)
Dept: ENDOSCOPY | Age: 52
End: 2022-08-04
Payer: COMMERCIAL

## 2022-08-05 ENCOUNTER — HOSPITAL ENCOUNTER (OUTPATIENT)
Dept: ENDOSCOPY | Age: 52
Setting detail: OUTPATIENT SURGERY
Discharge: HOME | End: 2022-08-05

## 2022-08-05 ENCOUNTER — HOSPITAL ENCOUNTER (OUTPATIENT)
Age: 52
Setting detail: OUTPATIENT SURGERY
Discharge: HOME | End: 2022-08-05
Attending: INTERNAL MEDICINE | Admitting: INTERNAL MEDICINE
Payer: COMMERCIAL

## 2022-08-05 ENCOUNTER — ANESTHESIA (OUTPATIENT)
Dept: ENDOSCOPY | Age: 52
End: 2022-08-05
Payer: COMMERCIAL

## 2022-08-05 VITALS
HEART RATE: 75 BPM | HEIGHT: 70 IN | DIASTOLIC BLOOD PRESSURE: 80 MMHG | BODY MASS INDEX: 30.78 KG/M2 | TEMPERATURE: 96.7 F | WEIGHT: 215 LBS | RESPIRATION RATE: 18 BRPM | SYSTOLIC BLOOD PRESSURE: 118 MMHG | OXYGEN SATURATION: 98 %

## 2022-08-05 PROCEDURE — 3700000000 HC ANESTHESIA ATTENDED CARE: Performed by: INTERNAL MEDICINE

## 2022-08-05 PROCEDURE — 7100000001 HC PACU RECOVERY - ADDTL 15 MIN: Performed by: INTERNAL MEDICINE

## 2022-08-05 PROCEDURE — 7100000010 HC PHASE II RECOVERY - FIRST 15 MIN: Performed by: INTERNAL MEDICINE

## 2022-08-05 PROCEDURE — 3700000001 HC ADD 15 MINUTES (ANESTHESIA): Performed by: INTERNAL MEDICINE

## 2022-08-05 PROCEDURE — 3609017100 HC EGD: Performed by: INTERNAL MEDICINE

## 2022-08-05 PROCEDURE — 7100000011 HC PHASE II RECOVERY - ADDTL 15 MIN: Performed by: INTERNAL MEDICINE

## 2022-08-05 PROCEDURE — 7100000000 HC PACU RECOVERY - FIRST 15 MIN: Performed by: INTERNAL MEDICINE

## 2022-08-05 PROCEDURE — 2709999900 HC NON-CHARGEABLE SUPPLY: Performed by: INTERNAL MEDICINE

## 2022-08-05 PROCEDURE — 2500000003 HC RX 250 WO HCPCS: Performed by: NURSE ANESTHETIST, CERTIFIED REGISTERED

## 2022-08-05 PROCEDURE — 2580000003 HC RX 258: Performed by: ANESTHESIOLOGY

## 2022-08-05 PROCEDURE — 6360000002 HC RX W HCPCS: Performed by: NURSE ANESTHETIST, CERTIFIED REGISTERED

## 2022-08-05 RX ORDER — LIDOCAINE HYDROCHLORIDE 20 MG/ML
INJECTION, SOLUTION EPIDURAL; INFILTRATION; INTRACAUDAL; PERINEURAL PRN
Status: DISCONTINUED | OUTPATIENT
Start: 2022-08-05 | End: 2022-08-05 | Stop reason: SDUPTHER

## 2022-08-05 RX ORDER — DIPHENHYDRAMINE HYDROCHLORIDE 50 MG/ML
12.5 INJECTION INTRAMUSCULAR; INTRAVENOUS
Status: DISCONTINUED | OUTPATIENT
Start: 2022-08-05 | End: 2022-08-05 | Stop reason: HOSPADM

## 2022-08-05 RX ORDER — SODIUM CHLORIDE 9 MG/ML
INJECTION, SOLUTION INTRAVENOUS PRN
Status: DISCONTINUED | OUTPATIENT
Start: 2022-08-05 | End: 2022-08-05 | Stop reason: HOSPADM

## 2022-08-05 RX ORDER — ONDANSETRON 2 MG/ML
4 INJECTION INTRAMUSCULAR; INTRAVENOUS
Status: DISCONTINUED | OUTPATIENT
Start: 2022-08-05 | End: 2022-08-05 | Stop reason: HOSPADM

## 2022-08-05 RX ORDER — SODIUM CHLORIDE 0.9 % (FLUSH) 0.9 %
5-40 SYRINGE (ML) INJECTION PRN
Status: DISCONTINUED | OUTPATIENT
Start: 2022-08-05 | End: 2022-08-05 | Stop reason: HOSPADM

## 2022-08-05 RX ORDER — SODIUM CHLORIDE 0.9 % (FLUSH) 0.9 %
5-40 SYRINGE (ML) INJECTION EVERY 12 HOURS SCHEDULED
Status: DISCONTINUED | OUTPATIENT
Start: 2022-08-05 | End: 2022-08-05 | Stop reason: HOSPADM

## 2022-08-05 RX ORDER — PROPOFOL 10 MG/ML
INJECTION, EMULSION INTRAVENOUS PRN
Status: DISCONTINUED | OUTPATIENT
Start: 2022-08-05 | End: 2022-08-05 | Stop reason: SDUPTHER

## 2022-08-05 RX ADMIN — PROPOFOL 150 MCG/KG/MIN: 10 INJECTION, EMULSION INTRAVENOUS at 13:13

## 2022-08-05 RX ADMIN — LIDOCAINE HYDROCHLORIDE 100 MG: 20 INJECTION, SOLUTION EPIDURAL; INFILTRATION; INTRACAUDAL; PERINEURAL at 13:13

## 2022-08-05 RX ADMIN — SODIUM CHLORIDE: 9 INJECTION, SOLUTION INTRAVENOUS at 11:46

## 2022-08-05 RX ADMIN — SODIUM CHLORIDE: 9 INJECTION, SOLUTION INTRAVENOUS at 13:12

## 2022-08-05 ASSESSMENT — PAIN - FUNCTIONAL ASSESSMENT
PAIN_FUNCTIONAL_ASSESSMENT: NONE - DENIES PAIN
PAIN_FUNCTIONAL_ASSESSMENT: 0-10

## 2022-08-05 ASSESSMENT — PAIN SCALES - GENERAL: PAINLEVEL_OUTOF10: 0

## 2022-08-05 ASSESSMENT — LIFESTYLE VARIABLES: SMOKING_STATUS: 0

## 2022-08-05 NOTE — ANESTHESIA PRE PROCEDURE
Department of Anesthesiology  Preprocedure Note       Name:  Vamsi Doe   Age:  46 y.o.  :  1970                                          MRN:  [id number]         Date:  2022      Surgeon: Lord Griffith):  Sumit Cm MD    Procedure: Procedure(s):  ESOPHAGOGASTRODUODENOSCOPY    Medications prior to admission:   Prior to Admission medications    Medication Sig Start Date End Date Taking? Authorizing Provider   Cholecalciferol (VITAMIN D3) 125 MCG (5000 UT) TABS Take by mouth daily   Yes Historical Provider, MD   inFLIXimab-dyyb (INFLECTRA IV) Infuse intravenously Once every 6 weeks    Historical Provider, MD   pantoprazole (PROTONIX) 40 MG tablet Take 40 mg by mouth daily    Historical Provider, MD   methotrexate (RHEUMATREX) 2.5 MG chemo tablet Take 10 mg by mouth once a week Takes 4  2.5 mg tabs to equal 10 mg weekly on Wednesday    Historical Provider, MD   folic acid (FOLVITE) 1 MG tablet Take 1 mg by mouth daily    Historical Provider, MD   hydroCHLOROthiazide (HYDRODIURIL) 25 MG tablet Take 25 mg by mouth daily    Historical Provider, MD   vitamin B-12 (CYANOCOBALAMIN) 1000 MCG tablet Take 1,000 mcg by mouth daily    Historical Provider, MD   fexofenadine (ALLEGRA) 180 MG tablet Take 180 mg by mouth daily    Historical Provider, MD   losartan (COZAAR) 100 MG tablet Take 100 mg by mouth daily    Historical Provider, MD       Current medications:    Current Facility-Administered Medications   Medication Dose Route Frequency Provider Last Rate Last Admin    sodium chloride flush 0.9 % injection 5-40 mL  5-40 mL IntraVENous 2 times per day Dara Lemra MD        sodium chloride flush 0.9 % injection 5-40 mL  5-40 mL IntraVENous PRN Dara Lerma MD        0.9 % sodium chloride infusion   IntraVENous PRN Dara Lerma MD 25 mL/hr at 22 1146 New Bag at 22 1146       Allergies:     Allergies   Allergen Reactions    Fluorouracil Other (See Comments)     Cardiomyopathy     Methotrexate Other (See Comments)     Nausea       Problem List:    Patient Active Problem List   Diagnosis Code    Pneumoperitoneum K66.8    Perforated bowel ([facility] K63.1    Postoperative abdominal pain with fever R10.9, G89.18, R50.82    Fecal peritonitis ([facility] [address] K65.8    SIRS (systemic inflammatory response syndrome) ([facility] R65.10    Streptococcal bacteremia R78.81, B95.5    Colon cancer metastasized to liver ([facility] C18.9, C78.7    Low grade fever R50.9    Essential hypertension I10    Class 1 obesity due to excess calories with body mass index (BMI) of 33.0 to 33.9 in adult E66.09, Z68.33    Weight loss counseling, encounter for Z71.3    Malignant neoplasm of splenic flexure ([facility] C18.5    Colon obstruction ([facility] K56.609       Past Medical History:        Diagnosis Date    Arthritis     rheumatoid    Cancer ([facility] [facility] 2017    stage 4 colon cancer    Contact lens/glasses fitting     GERD (gastroesophageal reflux disease)     History of blood transfusion 06/2022    History of stomach ulcers 2022    Hypertension        Past Surgical History:        Procedure Laterality Date    COLON SURGERY  2017    colon resection with colostomy    COLONOSCOPY      COLONOSCOPY N/A 04/06/2022    COLONOSCOPY WITH BIOPSY performed by Teressa Schaumann, MD at [address] [place] [address]  2022    ENDOSCOPY, COLON, DIAGNOSTIC      PORT SURGERY      PAC on right chest    UPPER GASTROINTESTINAL ENDOSCOPY N/A 04/06/2022    EGD BIOPSY performed by Teressa Schaumann, MD at [address] [place] [address] History:    Social History     Tobacco Use    Smoking status: Never    Smokeless tobacco: Never   Substance Use Topics    Alcohol use: Yes     Comment: rarely                                Counseling given: Not Answered      Vital Signs (Current):   Vitals:    07/28/22 1309 08/05/22 1125 08/05/22 1136   BP:   133/89   Pulse:   81   Resp:   20   Temp:   (!) 96.7 °F (35.9 °C)   TempSrc:   Temporal   SpO2:   98%   Weight: 215 lb (97.5 kg) 215 lb (97.5 kg) 215 lb (97.5 kg)   Height: 5' 10\" (1.778 m)  5' 10\" (1.778 m)                                              BP Readings from Last 3 Encounters:   08/05/22 133/89   04/10/22 125/72   04/06/22 105/70       NPO Status: Time of last liquid consumption: 2100                        Time of last solid consumption: 2100                        Date of last liquid consumption: 08/04/22                        Date of last solid food consumption: 08/04/22    BMI:   Wt Readings from Last 3 Encounters:   08/05/22 215 lb (97.5 kg)   04/07/22 240 lb (108.9 kg)   04/06/22 242 lb (109.8 kg)     Body mass index is 30.85 kg/m². CBC:   Lab Results   Component Value Date/Time    WBC 5.7 04/10/2022 06:28 AM    RBC 4.26 04/10/2022 06:28 AM    HGB 13.7 04/10/2022 06:28 AM    HCT 40.5 04/10/2022 06:28 AM    MCV 95.2 04/10/2022 06:28 AM    RDW 13.7 04/10/2022 06:28 AM     04/10/2022 06:28 AM       CMP:   Lab Results   Component Value Date/Time     04/10/2022 05:02 AM    K 5.0 04/10/2022 05:02 AM     04/10/2022 05:02 AM    CO2 18 04/10/2022 05:02 AM    BUN 7 04/10/2022 05:02 AM    CREATININE 1.1 04/10/2022 05:02 AM    GFRAA >60 04/10/2022 05:02 AM    AGRATIO 1.7 04/06/2022 08:20 PM    LABGLOM >60 04/10/2022 05:02 AM    GLUCOSE 95 04/10/2022 05:02 AM    PROT 7.5 04/06/2022 08:20 PM    CALCIUM 9.2 04/10/2022 05:02 AM    BILITOT 0.5 04/06/2022 08:20 PM    ALKPHOS 54 04/06/2022 08:20 PM    AST 21 04/06/2022 08:20 PM    ALT 16 04/06/2022 08:20 PM       POC Tests: No results for input(s): POCGLU, POCNA, POCK, POCCL, POCBUN, POCHEMO, POCHCT in the last 72 hours.     Coags: No results found for: PROTIME, INR, APTT    HCG (If Applicable): No results found for: PREGTESTUR, PREGSERUM, HCG, HCGQUANT     ABGs: No results found for: PHART, PO2ART, DSV7MNP, CLE3GWE, BEART, B8ZWHIZU     Type & Screen (If Applicable):  No results found for: LABABO, LABRH    Drug/Infectious Status (If Applicable):  No results found

## 2022-08-05 NOTE — H&P
[address]   Pre-operative History and Physical    Patient: Shilpi Lofton  : 1970  Acct#:     HISTORY OF PRESENT ILLNESS:    The patient is a 46 y.o. male who presents for EGD    Indications: GI bleed follow up, recent GI bleed felt secondary to ulcer from NG trauma following surgery    Past Medical History:        Diagnosis Date    Arthritis     rheumatoid    Cancer ([address]     stage 4 colon cancer    Contact lens/glasses fitting     GERD (gastroesophageal reflux disease)     History of blood transfusion 2022    History of stomach ulcers     Hypertension       Past Surgical History:        Procedure Laterality Date    COLON SURGERY      colon resection with colostomy    COLONOSCOPY      COLONOSCOPY N/A 2022    COLONOSCOPY WITH BIOPSY performed by Zahra Romero MD at [address] [place] [address]      ENDOSCOPY, COLON, DIAGNOSTIC      PORT SURGERY      PAC on right chest    UPPER GASTROINTESTINAL ENDOSCOPY N/A 2022    EGD BIOPSY performed by Zahra Romero MD at [facility]      Medications Prior to Admission:   No current facility-administered medications on file prior to encounter.      Current Outpatient Medications on File Prior to Encounter   Medication Sig Dispense Refill    Cholecalciferol (VITAMIN D3) 125 MCG (5000 UT) TABS Take by mouth daily      inFLIXimab-dyyb (INFLECTRA IV) Infuse intravenously Once every 6 weeks      pantoprazole (PROTONIX) 40 MG tablet Take 40 mg by mouth daily      methotrexate (RHEUMATREX) 2.5 MG chemo tablet Take 10 mg by mouth once a week Takes 4  2.5 mg tabs to equal 10 mg weekly on Wednesday      folic acid (FOLVITE) 1 MG tablet Take 1 mg by mouth daily      hydroCHLOROthiazide (HYDRODIURIL) 25 MG tablet Take 25 mg by mouth daily      vitamin B-12 (CYANOCOBALAMIN) 1000 MCG tablet Take 1,000 mcg by mouth daily      fexofenadine (ALLEGRA) 180 MG tablet Take 180 mg by mouth daily      losartan (COZAAR) 100 MG tablet Take 100 mg by mouth daily          Allergies:  Fluorouracil and Methotrexate    Social History:   Social History     Socioeconomic History    Marital status:      Spouse name: Not on file    Number of children: Not on file    Years of education: Not on file    Highest education level: Not on file   Occupational History    Not on file   Tobacco Use    Smoking status: Never    Smokeless tobacco: Never   Vaping Use    Vaping Use: Never used   Substance and Sexual Activity    Alcohol use: Yes     Comment: rarely    Drug use: Never    Sexual activity: Not Currently   Other Topics Concern    Not on file   Social History Narrative    Not on file     Social Determinants of Health     Financial Resource Strain: Not on file   Food Insecurity: Not on file   Transportation Needs: Not on file   Physical Activity: Not on file   Stress: Not on file   Social Connections: Not on file   Intimate Partner Violence: Not on file   Housing Stability: Not on file      Family History:       Problem Relation Age of Onset    Diabetes Mother     Cancer Father     Cancer Sister         prostate cancer        PHYSICAL EXAM:      /89   Pulse 81   Temp (!) 96.7 °F (35.9 °C) (Temporal)   Resp 20   Ht 5' 10\" (1.778 m)   Wt 215 lb (97.5 kg)   SpO2 98%   BMI 30.85 kg/m²  I        Heart:  Normal apical impulse, regular rate and rhythm, normal S1 and S2, no S3 or S4, and no murmur noted    Lungs:  No increased work of breathing, good air exchange, clear to auscultation bilaterally, no crackles or wheezing    Abdomen:  No scars, normal bowel sounds, soft, non-distended, non-tender, no masses palpated, no hepatosplenomegally      ASA Class  ASA 2 - Patient with mild systemic disease with no functional limitations    Mallampati Class: II      ASSESSMENT AND PLAN:    1. Patient is a suitable candidate for endoscopic procedure and attendant anesthesia  2.   Risks, benefits, alternatives of procedure discussed in detail with patient including risks of bleeding, infection, perforation, risks of sedation, risks of missed lesions. The patient wishes to proceed.

## 2022-08-05 NOTE — ANESTHESIA POSTPROCEDURE EVALUATION
Department of Anesthesiology  Postprocedure Note    Patient: Tanvir Melchor  MRN: [id number]  YOB: 1970  Date of evaluation: 8/5/2022      Procedure Summary     Date: 08/05/22 Room / Location: [address] [place] [address]    Anesthesia Start: 2364 Anesthesia Stop: 1322    Procedure: ESOPHAGOGASTRODUODENOSCOPY Diagnosis:       Gastroesophageal reflux disease, unspecified whether esophagitis present      (GERD (GASTROESOPHAGEAL REFLUX DISEASE))    Surgeons: Timothy Bacon MD Responsible Provider: Gem Rey MD    Anesthesia Type: MAC ASA Status: 2          Anesthesia Type: No value filed.     Davis Phase I: Davis Score: 10    Davis Phase II: Davis Score: 10      Anesthesia Post Evaluation    Patient location during evaluation: bedside  Patient participation: complete - patient participated  Level of consciousness: awake and alert  Pain score: 0  Nausea & Vomiting: no nausea  Complications: no  Cardiovascular status: hemodynamically stable  Respiratory status: acceptable  Hydration status: stable

## 2022-08-05 NOTE — DISCHARGE INSTRUCTIONS
Impression:     1) retained food and fluid in stomach obscuring 30-40 percent of the mucosa  2) postoperative changes in the gastric body  3) otherwise normal upper endoscopy without evidence of bleeding    Recommendations:    EGD and colonoscopy in one year    Discharge Instructions for Colonoscopy     Colonoscopy is a visual exam of the lining of the large intestine, also called the bowel or colon, with a colonoscope. A colonoscope is a flexible tube with a light and a viewing device. It allows the doctor to view the inside of the colon through a tiny video camera. Colonoscopy is performed for many reasons: unexplained anemia , pain, diarrhea , bloody stools, cancer screening, among many other reasons. Complications from a colonoscopy are rare. Some possible serious complications include perforated bowel (which might require surgery) and bleeding (which could require blood transfusion ). Minor complications include bloating, gas, and cramping that can last for 1-2 days after the procedure. Because air is put into your colon during the procedure, it is normal to pass large amounts of air from your rectum. You may not have a bowel movement for 1-3 days after the procedure. What You Will Need:  Someone to drive you home after the procedure     Steps to Take:  [address] when you get home. Because the sedative will make you drowsy, don't drive, operate machinery, or make important decisions the day of the procedure. Feelings of bloating, gas, or cramping may persist for 24 hours. Diet -  Try sips of water first. If tolerated, resume bland food (scrambled eggs, toast, soup) first.  If tolerated, resume regular diet or the diet recommended by your physician. Do not drink alcohol for 24 hours. Physical Activity -  Ask your doctor when you will be able to return to work. Do not drive, operate heavy machinery, or do activities that require coordination or balance for 24 hours. Otherwise, return to your normal routine as soon as you are comfortable to do so, which is usually the next day after the procedure. Medications - When taking medications, it's important to: Take your medication as directed, not more, not less, not at a different time. Do not stop taking them without consulting your healthcare provider. Don't share them with anyone else. Know what effects and side effects to expect, and report them to your healthcare provider. If you are taking more than one drug, even if it is an over-the-counter medication, herb, or dietary supplement, be sure to check with a physician or pharmacist about drug interactions. Plan ahead for refills so you don't run out. Lifestyle Changes - The results of your colonoscopy will determine if any lifestyle changes are necessary. Follow-up:  The doctor will usually give you a preliminary report after the medication wears off and you are more alert. The results from a biopsy can take as long as 1-2 weeks to be completed. Schedule a follow-up appointment as directed by your doctor. You should schedule a follow-up colonoscopy as recommended by your doctor. Call Your Doctor If Any of the Following Occurs:  Bleeding from your rectum; notify your doctor if you pass a teaspoonful or more of blood   Black, tarry stools   Severe abdominal pain   Hard, swollen abdomen   Signs of infection, including fever or chills   Inability to pass gas or stool   Coughing, shortness of breath, chest pain, severe nausea or vomiting     In case of an emergency, call 911 immediately. Clark Justin MD    With questions or concerns call Dr Davie Jama at .

## 2022-08-05 NOTE — OP NOTE
tolerated the procedure well and was taken to the PACU in good condition.       Estimated Blood Loss (mL): minimal (< 50 mL)    Complications: None    Impression:     1) retained food and fluid in stomach obscuring 30-40 percent of the mucosa  2) postoperative changes in the gastric body  3) otherwise normal upper endoscopy without evidence of bleeding    Recommendations:    EGD and colonoscopy in one year    Lorraine Link MD  [facility]  8/5/2022  [phone]

## (undated) DEVICE — FORCEPS BX 240CM 2.4MM L NDL RAD JAW 4 M00513334

## (undated) DEVICE — ENDOSCOPY KIT: Brand: MEDLINE INDUSTRIES, INC.

## (undated) DEVICE — BITE BLOCK ENDOSCP AD 60 FR W/ ADJ STRP PLAS GRN BLOX